# Patient Record
Sex: MALE | Race: WHITE | NOT HISPANIC OR LATINO | Employment: FULL TIME | URBAN - METROPOLITAN AREA
[De-identification: names, ages, dates, MRNs, and addresses within clinical notes are randomized per-mention and may not be internally consistent; named-entity substitution may affect disease eponyms.]

---

## 2017-01-20 RX ORDER — TADALAFIL 5 MG
TABLET ORAL
Qty: 30 TABLET | Refills: 2 | Status: SHIPPED | OUTPATIENT
Start: 2017-01-20 | End: 2018-09-17

## 2017-02-07 NOTE — TELEPHONE ENCOUNTER
2/7/17    Can we phone this rx refill into the pharmacy?    Kris has been checked and scanned into pt's chart (12/23/16).    Last OV was on 11/22/16, next OV is on 3/22/16.

## 2017-02-08 RX ORDER — BUTALBITAL, ACETAMINOPHEN, CAFFEINE, AND CODEINE PHOSPHATE 50; 300; 40; 30 MG/1; MG/1; MG/1; MG/1
CAPSULE ORAL
Qty: 40 CAPSULE | Refills: 0 | OUTPATIENT
Start: 2017-02-08 | End: 2017-02-21 | Stop reason: SDUPTHER

## 2017-02-08 NOTE — TELEPHONE ENCOUNTER
2/8/17    Can we phone this rx in to the pharmacy?    Kris has been checked and scanned into pt's chart (12/23/16).    Last OV was on 11/22/16, next OV is on 3/22/16.

## 2017-02-09 ENCOUNTER — TELEPHONE (OUTPATIENT)
Dept: INTERNAL MEDICINE | Facility: CLINIC | Age: 47
End: 2017-02-09

## 2017-02-09 RX ORDER — BUTALBITAL, ACETAMINOPHEN, CAFFEINE, AND CODEINE PHOSPHATE 50; 300; 40; 30 MG/1; MG/1; MG/1; MG/1
CAPSULE ORAL
Qty: 40 CAPSULE | Refills: 0 | OUTPATIENT
Start: 2017-02-09

## 2017-02-09 NOTE — TELEPHONE ENCOUNTER
----- Message from Angelica Daigle sent at 2/9/2017 12:35 PM EST -----  THE PATIENT IS CALLING IN REGARDS TO HIS FLORICET MEDICATION THAT HE HAD CALLED TO GET A REFILL ON AND WOULD LIKE FOR YOU TO GIVE HIM A CALL BACK BECAUSE HIS PHARMACY IS TELLING HIM THEY STILL HAVEN'T RECEIVED ANYTHING FROM OUR OFFICE. PT CALL BACK NUMBER [phone]

## 2017-02-14 ENCOUNTER — TELEPHONE (OUTPATIENT)
Dept: INTERNAL MEDICINE | Facility: CLINIC | Age: 47
End: 2017-02-14

## 2017-02-14 NOTE — TELEPHONE ENCOUNTER
2/14/17    Need to do a prior auth for cialis.    Started a PA through cover my meds:    [facility] Member ID: [id number]    RX Bin: [id number]    Rx Group: [facility] PPO Gold Plan    Member ID: [id number]    Rx Bin: [id number]    Rx Group: [facility]

## 2017-02-22 ENCOUNTER — TELEPHONE (OUTPATIENT)
Dept: INTERNAL MEDICINE | Facility: CLINIC | Age: 47
End: 2017-02-22

## 2017-02-22 NOTE — TELEPHONE ENCOUNTER
----- Message from Hilary Salgado sent at 2/22/2017  9:03 AM EST -----  Contact: PATIENT   RE: AMBIEN CONTROLLED RELEASE    PATIENT IS REQUESTING AN ORDER FOR AMBIEN CONTROLLED RELEASE INSTEAD OF REGULAR AMBIEN 10MG. HE WOULD LIKE TO KNOW IF THIS COULD BE DONE TODAY. HE STATES THAT HE HAS HAD A RECENT LOSS OF A FAMILY MEMBER AND IS NOT SLEEPING WELL.    [facility] IN [place], [place]

## 2017-02-24 RX ORDER — BUTALBITAL, ACETAMINOPHEN, CAFFEINE, AND CODEINE PHOSPHATE 50; 300; 40; 30 MG/1; MG/1; MG/1; MG/1
CAPSULE ORAL
Qty: 40 CAPSULE | Refills: 0 | OUTPATIENT
Start: 2017-02-24 | End: 2018-06-14 | Stop reason: SDUPTHER

## 2017-02-24 RX ORDER — ZOLPIDEM TARTRATE 12.5 MG/1
12.5 TABLET, FILM COATED, EXTENDED RELEASE ORAL NIGHTLY PRN
Qty: 30 TABLET | Refills: 3 | OUTPATIENT
Start: 2017-02-24 | End: 2017-10-17

## 2017-02-24 NOTE — TELEPHONE ENCOUNTER
2/24/17    Pt called to check the status of request. Since the response was sent to another clinical staff, I did not get this response from the provider.    Rx has been phoned in to pharmacy.

## 2017-03-01 ENCOUNTER — HOSPITAL ENCOUNTER (EMERGENCY)
Facility: HOSPITAL | Age: 47
Discharge: HOME | End: 2017-03-01
Attending: EMERGENCY MEDICINE | Admitting: EMERGENCY MEDICINE

## 2017-03-01 VITALS
DIASTOLIC BLOOD PRESSURE: 79 MMHG | BODY MASS INDEX: 31.1 KG/M2 | HEIGHT: 69 IN | OXYGEN SATURATION: 97 % | HEART RATE: 66 BPM | TEMPERATURE: 98.6 F | RESPIRATION RATE: 18 BRPM | SYSTOLIC BLOOD PRESSURE: 127 MMHG | WEIGHT: 210 LBS

## 2017-03-01 DIAGNOSIS — F41.9 ANXIETY: ICD-10-CM

## 2017-03-01 DIAGNOSIS — K52.9 GASTROENTERITIS: Primary | ICD-10-CM

## 2017-03-01 LAB
ALBUMIN SERPL-MCNC: 4.7 G/DL (ref 3.5–5)
ALBUMIN/GLOB SERPL: 1.5 G/DL (ref 1–2)
ALP SERPL-CCNC: 98 U/L (ref 38–126)
ALT SERPL W P-5'-P-CCNC: 43 U/L (ref 13–69)
ANION GAP SERPL CALCULATED.3IONS-SCNC: 13.7 MMOL/L
AST SERPL-CCNC: 29 U/L (ref 15–46)
BASOPHILS # BLD AUTO: 0.13 10*3/MM3 (ref 0–0.2)
BASOPHILS NFR BLD AUTO: 1.4 % (ref 0–2.5)
BILIRUB SERPL-MCNC: 0.6 MG/DL (ref 0.2–1.3)
BUN BLD-MCNC: 14 MG/DL (ref 7–20)
BUN/CREAT SERPL: 14 (ref 6.3–21.9)
CALCIUM SPEC-SCNC: 9.4 MG/DL (ref 8.4–10.2)
CHLORIDE SERPL-SCNC: 103 MMOL/L (ref 98–107)
CK MB SERPL-CCNC: 0.61 NG/ML (ref 0.2–2.4)
CK SERPL-CCNC: 69 U/L (ref 30–170)
CO2 SERPL-SCNC: 28 MMOL/L (ref 26–30)
CREAT BLD-MCNC: 1 MG/DL (ref 0.6–1.3)
DEPRECATED RDW RBC AUTO: 39.7 FL (ref 37–54)
EOSINOPHIL # BLD AUTO: 0.37 10*3/MM3 (ref 0–0.7)
EOSINOPHIL NFR BLD AUTO: 3.9 % (ref 0–7)
ERYTHROCYTE [DISTWIDTH] IN BLOOD BY AUTOMATED COUNT: 12.2 % (ref 11.5–14.5)
GFR SERPL CREATININE-BSD FRML MDRD: 80 ML/MIN/1.73
GLOBULIN UR ELPH-MCNC: 3.2 GM/DL
GLUCOSE BLD-MCNC: 167 MG/DL (ref 74–98)
HCT VFR BLD AUTO: 48.6 % (ref 42–52)
HGB BLD-MCNC: 16.9 G/DL (ref 14–18)
HOLD SPECIMEN: NORMAL
HOLD SPECIMEN: NORMAL
IMM GRANULOCYTES # BLD: 0.03 10*3/MM3 (ref 0–0.06)
IMM GRANULOCYTES NFR BLD: 0.3 % (ref 0–0.6)
LIPASE SERPL-CCNC: 79 U/L (ref 23–300)
LYMPHOCYTES # BLD AUTO: 3.88 10*3/MM3 (ref 0.6–3.4)
LYMPHOCYTES NFR BLD AUTO: 41.3 % (ref 10–50)
MCH RBC QN AUTO: 31 PG (ref 27–31)
MCHC RBC AUTO-ENTMCNC: 34.8 G/DL (ref 30–37)
MCV RBC AUTO: 89 FL (ref 80–94)
MONOCYTES # BLD AUTO: 0.81 10*3/MM3 (ref 0–0.9)
MONOCYTES NFR BLD AUTO: 8.6 % (ref 0–12)
NEUTROPHILS # BLD AUTO: 4.18 10*3/MM3 (ref 2–6.9)
NEUTROPHILS NFR BLD AUTO: 44.5 % (ref 37–80)
NRBC BLD MANUAL-RTO: 0 /100 WBC (ref 0–0)
PLATELET # BLD AUTO: 304 10*3/MM3 (ref 130–400)
PMV BLD AUTO: 9.6 FL (ref 6–12)
POTASSIUM BLD-SCNC: 4.7 MMOL/L (ref 3.5–5.1)
PROT SERPL-MCNC: 7.9 G/DL (ref 6.3–8.2)
RBC # BLD AUTO: 5.46 10*6/MM3 (ref 4.7–6.1)
SODIUM BLD-SCNC: 140 MMOL/L (ref 137–145)
TROPONIN I SERPL-MCNC: <0.012 NG/ML (ref 0–0.03)
WBC NRBC COR # BLD: 9.4 10*3/MM3 (ref 4.8–10.8)
WHOLE BLOOD HOLD SPECIMEN: NORMAL
WHOLE BLOOD HOLD SPECIMEN: NORMAL

## 2017-03-01 PROCEDURE — 80053 COMPREHEN METABOLIC PANEL: CPT | Performed by: EMERGENCY MEDICINE

## 2017-03-01 PROCEDURE — 85025 COMPLETE CBC W/AUTO DIFF WBC: CPT | Performed by: EMERGENCY MEDICINE

## 2017-03-01 PROCEDURE — 36415 COLL VENOUS BLD VENIPUNCTURE: CPT

## 2017-03-01 PROCEDURE — 84484 ASSAY OF TROPONIN QUANT: CPT | Performed by: EMERGENCY MEDICINE

## 2017-03-01 PROCEDURE — 82550 ASSAY OF CK (CPK): CPT | Performed by: EMERGENCY MEDICINE

## 2017-03-01 PROCEDURE — 83690 ASSAY OF LIPASE: CPT | Performed by: EMERGENCY MEDICINE

## 2017-03-01 PROCEDURE — 25010000002 KETOROLAC TROMETHAMINE PER 15 MG: Performed by: EMERGENCY MEDICINE

## 2017-03-01 PROCEDURE — 96361 HYDRATE IV INFUSION ADD-ON: CPT

## 2017-03-01 PROCEDURE — 82553 CREATINE MB FRACTION: CPT | Performed by: EMERGENCY MEDICINE

## 2017-03-01 PROCEDURE — 99284 EMERGENCY DEPT VISIT MOD MDM: CPT

## 2017-03-01 PROCEDURE — 93005 ELECTROCARDIOGRAM TRACING: CPT | Performed by: EMERGENCY MEDICINE

## 2017-03-01 PROCEDURE — 96374 THER/PROPH/DIAG INJ IV PUSH: CPT

## 2017-03-01 RX ORDER — PROPRANOLOL HYDROCHLORIDE 20 MG/1
40 TABLET ORAL ONCE
Status: COMPLETED | OUTPATIENT
Start: 2017-03-01 | End: 2017-03-01

## 2017-03-01 RX ORDER — PROPRANOLOL HYDROCHLORIDE 40 MG/1
40 TABLET ORAL 2 TIMES DAILY
Qty: 14 TABLET | Refills: 0 | Status: SHIPPED | OUTPATIENT
Start: 2017-03-01 | End: 2017-03-08

## 2017-03-01 RX ORDER — SODIUM CHLORIDE 0.9 % (FLUSH) 0.9 %
10 SYRINGE (ML) INJECTION AS NEEDED
Status: DISCONTINUED | OUTPATIENT
Start: 2017-03-01 | End: 2017-03-01 | Stop reason: HOSPADM

## 2017-03-01 RX ORDER — ONDANSETRON 4 MG/1
4 TABLET, ORALLY DISINTEGRATING ORAL EVERY 6 HOURS PRN
Qty: 10 TABLET | Refills: 0 | Status: SHIPPED | OUTPATIENT
Start: 2017-03-01 | End: 2018-09-17

## 2017-03-01 RX ORDER — KETOROLAC TROMETHAMINE 30 MG/ML
30 INJECTION, SOLUTION INTRAMUSCULAR; INTRAVENOUS ONCE
Status: COMPLETED | OUTPATIENT
Start: 2017-03-01 | End: 2017-03-01

## 2017-03-01 RX ADMIN — KETOROLAC TROMETHAMINE 30 MG: 30 INJECTION, SOLUTION INTRAMUSCULAR at 08:24

## 2017-03-01 RX ADMIN — PROPRANOLOL HYDROCHLORIDE 40 MG: 20 TABLET ORAL at 08:24

## 2017-03-01 RX ADMIN — SODIUM CHLORIDE 1000 ML: 9 INJECTION, SOLUTION INTRAVENOUS at 07:53

## 2017-03-01 NOTE — ED PROVIDER NOTES
Subjective   History of Present Illness  TRIAGE CHIEF COMPLAINT:   Chief Complaint   Patient presents with   • Vomiting   • Anxiety         HPI: Sina Reyez   is a 46 y.o. male   who presents to the emergency department complaining of nausea and anxiety.  Patient states he has been ill with stomach flu for the past 2 days.  He has had symptoms of nausea vomiting and diarrhea.  Last episodes of vomiting and diarrhea yesterday however he does have residual nausea.  Patient states he feels palpitations, some chest pressure and states this is similar to anxiety attacks he used to experience.  Patient states he had been doing well recently however today he began thinking about recent deaths in the family and believes this may have triggered his current symptoms.  Denies SI.  Denies substance abuse.  Denies maisha chest pain, fever, chills, near-syncope.           Review of Systems   All other systems reviewed and are negative.      Past Medical History   Diagnosis Date   • Acute sinusitis    • Acute stress disorder    • Anxiety    • Hypertension    • MRSA infection    • Rib pain on left side    • Toe injury    • Verruca    • Viral gastroenteritis        No Known Allergies    History reviewed. No pertinent past surgical history.    Family History   Problem Relation Age of Onset   • Transient ischemic attack Mother    • Stroke Father        Social History     Social History   • Marital status:      Spouse name: N/A   • Number of children: N/A   • Years of education: N/A     Social History Main Topics   • Smoking status: Never Smoker   • Smokeless tobacco: Never Used   • Alcohol use Yes      Comment: ocassionally   • Drug use: No   • Sexual activity: Defer     Other Topics Concern   • None     Social History Narrative   • None           Objective   Physical Exam    CONSTITUTIONAL: Awake, oriented, appears anxious but non-toxic   HENT: Atraumatic, normocephalic, oral mucosa pink and moist, airway patent. Nares  patent without drainage. External ears normal.   EYES: Conjunctiva clear, EOMI, PERRL   NECK: Trachea midline, non-tender, supple   CARDIOVASCULAR: Normal heart rate, Normal rhythm, No murmurs, rubs, gallops   PULMONARY/CHEST: Clear to auscultation, no rhonchi, wheezes, or rales. Symmetrical breath sounds. Non-tender.   ABDOMINAL: Non-distended, soft, non-tender - no rebound or guarding. BS normal.   NEUROLOGIC: Non-focal, moving all four extremities, no gross sensory or motor deficits.   EXTREMITIES: No clubbing, cyanosis, or edema   SKIN: Warm, Dry, No erythema, No rash     No orders to display           EKG:  NSR, rate 102.  No acute ST changes.         Procedures         ED Course  ED Course          ED COURSE / MEDICAL DECISION MAKING:   Symptoms resolved with medication.  Patient is well-appearing.  Unremarkable workup.  Plan for close outpatient follow-up versus return if worse.    DECISION TO DISCHARGE/ADMIT: see ED care timeline       Electronically signed by: Lobito Lama MD, 3/1/2017 10:30 AM              [facility]    Final diagnoses:   Gastroenteritis   Anxiety            Lobito Lama MD  03/01/17 1035

## 2017-03-06 RX ORDER — LISINOPRIL 10 MG/1
TABLET ORAL
Qty: 90 TABLET | Refills: 3 | Status: SHIPPED | OUTPATIENT
Start: 2017-03-06 | End: 2017-10-17 | Stop reason: SDUPTHER

## 2017-10-17 ENCOUNTER — OFFICE VISIT (OUTPATIENT)
Dept: INTERNAL MEDICINE | Facility: CLINIC | Age: 47
End: 2017-10-17

## 2017-10-17 VITALS
HEART RATE: 93 BPM | WEIGHT: 217 LBS | DIASTOLIC BLOOD PRESSURE: 78 MMHG | OXYGEN SATURATION: 99 % | SYSTOLIC BLOOD PRESSURE: 146 MMHG | BODY MASS INDEX: 32.05 KG/M2

## 2017-10-17 DIAGNOSIS — E11.9 TYPE 2 DIABETES MELLITUS WITHOUT COMPLICATION, WITH LONG-TERM CURRENT USE OF INSULIN (HCC): Primary | ICD-10-CM

## 2017-10-17 DIAGNOSIS — I10 BENIGN ESSENTIAL HYPERTENSION: ICD-10-CM

## 2017-10-17 DIAGNOSIS — Z79.4 TYPE 2 DIABETES MELLITUS WITHOUT COMPLICATION, WITH LONG-TERM CURRENT USE OF INSULIN (HCC): Primary | ICD-10-CM

## 2017-10-17 DIAGNOSIS — G43.009 MIGRAINE WITHOUT AURA AND WITHOUT STATUS MIGRAINOSUS, NOT INTRACTABLE: ICD-10-CM

## 2017-10-17 LAB
ALBUMIN SERPL-MCNC: 4.4 G/DL (ref 3.2–4.8)
ALBUMIN/GLOB SERPL: 1.6 G/DL (ref 1.5–2.5)
ALP SERPL-CCNC: 137 U/L (ref 25–100)
ALT SERPL W P-5'-P-CCNC: 31 U/L (ref 7–40)
ANION GAP SERPL CALCULATED.3IONS-SCNC: 7 MMOL/L (ref 3–11)
ARTICHOKE IGE QN: 108 MG/DL (ref 0–130)
AST SERPL-CCNC: 25 U/L (ref 0–33)
BILIRUB SERPL-MCNC: 0.4 MG/DL (ref 0.3–1.2)
BUN BLD-MCNC: 19 MG/DL (ref 9–23)
BUN/CREAT SERPL: 17.3 (ref 7–25)
CALCIUM SPEC-SCNC: 9.6 MG/DL (ref 8.7–10.4)
CHLORIDE SERPL-SCNC: 99 MMOL/L (ref 99–109)
CHOLEST SERPL-MCNC: 235 MG/DL (ref 0–200)
CO2 SERPL-SCNC: 29 MMOL/L (ref 20–31)
CREAT BLD-MCNC: 1.1 MG/DL (ref 0.6–1.3)
GFR SERPL CREATININE-BSD FRML MDRD: 71 ML/MIN/1.73
GLOBULIN UR ELPH-MCNC: 2.7 GM/DL
GLUCOSE BLD-MCNC: 297 MG/DL (ref 70–100)
GLUCOSE BLDC GLUCOMTR-MCNC: 317 MG/DL (ref 70–130)
HBA1C MFR BLD: 9.2 %
HDLC SERPL-MCNC: 44 MG/DL (ref 40–60)
POTASSIUM BLD-SCNC: 5.6 MMOL/L (ref 3.5–5.5)
PROT SERPL-MCNC: 7.1 G/DL (ref 5.7–8.2)
SODIUM BLD-SCNC: 135 MMOL/L (ref 132–146)
TRIGL SERPL-MCNC: 607 MG/DL (ref 0–150)

## 2017-10-17 PROCEDURE — 80053 COMPREHEN METABOLIC PANEL: CPT | Performed by: PHYSICIAN ASSISTANT

## 2017-10-17 PROCEDURE — 82947 ASSAY GLUCOSE BLOOD QUANT: CPT | Performed by: PHYSICIAN ASSISTANT

## 2017-10-17 PROCEDURE — 86341 ISLET CELL ANTIBODY: CPT | Performed by: PHYSICIAN ASSISTANT

## 2017-10-17 PROCEDURE — 99214 OFFICE O/P EST MOD 30 MIN: CPT | Performed by: PHYSICIAN ASSISTANT

## 2017-10-17 PROCEDURE — 84681 ASSAY OF C-PEPTIDE: CPT | Performed by: PHYSICIAN ASSISTANT

## 2017-10-17 PROCEDURE — 80061 LIPID PANEL: CPT | Performed by: PHYSICIAN ASSISTANT

## 2017-10-17 PROCEDURE — 83525 ASSAY OF INSULIN: CPT | Performed by: PHYSICIAN ASSISTANT

## 2017-10-17 PROCEDURE — 83036 HEMOGLOBIN GLYCOSYLATED A1C: CPT | Performed by: PHYSICIAN ASSISTANT

## 2017-10-17 PROCEDURE — 83527 ASSAY OF INSULIN: CPT | Performed by: PHYSICIAN ASSISTANT

## 2017-10-17 RX ORDER — LISINOPRIL 40 MG/1
TABLET ORAL
Qty: 90 TABLET | Refills: 1 | Status: SHIPPED | OUTPATIENT
Start: 2017-10-17 | End: 2018-04-17 | Stop reason: SDUPTHER

## 2017-10-17 NOTE — ASSESSMENT & PLAN NOTE
Headaches are unchanged.  Continue current treatment regimen.   Refilled fioricet/codeine #40, 0RF per Dr Suarez.    The patient has read and signed the [facility] Controlled Substance Contract.  I will continue to see patient for regular follow up appointments.  They are well controlled on their medication.  RAMYA is updated every 3 months. The patient is aware of the potential for addiction and dependence.

## 2017-10-17 NOTE — PROGRESS NOTES
Chief Complaint   Patient presents with   • Follow-up     type 2 diabetes mellitus, hypertension       Subjective   Sina Reyez is a 47 y.o. male.       History of Present Illness     Pt was previous pt of Dr Peter's and is here to establish care. He has not been out of his medication- has been able to use some of his dad's novolin 70/30, rarely uses his novolog for sliding scale with mealtime. His BS have been elevated in the mornings. He was diagnosed with diabetes in 4648-1436 and was told he had type 1. He has been on the same 20 units BID for years. He is careful about watching the carbs in his diet. Has not been exercising as much as he used to.    He had a rough beginning of the year with several illnesses- influenza, sinusitis and gastroenteritis. Seen at urgent care. Was given refills of his BP meds. BP was elevated over the summer and his lisinopril was increased to 20 mg. BP has continued to be above 150.    He is no longer taking the Ambien. Weaned himself off and is sleeping well without it.    He gets tension/migraine headaches about once a week and takes fioricet/codeine at onset. Has tried muscle relaxers and triptans with no relief. This is the only thing that works. He got rx for #40 tablets in Feb that have lasted until now.        Current Outpatient Prescriptions:   •  Cetirizine HCl (ZYRTEC ALLERGY) 10 MG capsule, Take 1 capsule by mouth As Needed., Disp: , Rfl:   •  CIALIS 5 MG tablet, TAKE ONE TABLET BY MOUTH DAILY, Disp: 30 tablet, Rfl: 2  •  FIORICET/CODEINE -25-30 MG capsule, TAKE ONE TO TWO CAPSULES BY MOUTH EVERY 4 TO 6 HOURS AS NEEDED, Disp: 40 capsule, Rfl: 0  •  insulin aspart protamine-insulin aspart (novoLOG 70/30) (70-30) 100 UNIT/ML injection, Inject 20 Units under the skin 2 (Two) Times a Day With Meals., Disp: , Rfl:   •  lisinopril (PRINIVIL,ZESTRIL) 40 MG tablet, TAKE ONE TABLET BY MOUTH DAILY., Disp: 90 tablet, Rfl: 1  •  naproxen sodium (ALEVE) 220 MG tablet,  Take 1 tablet by mouth As Needed., Disp: , Rfl:   •  ondansetron ODT (ZOFRAN-ODT) 4 MG disintegrating tablet, Take 1 tablet by mouth Every 6 (Six) Hours As Needed for nausea or vomiting., Disp: 10 tablet, Rfl: 0     PMFSH  The following portions of the patient's history were reviewed and updated as appropriate: allergies, current medications, past family history, past medical history, past social history, past surgical history and problem list.    Review of Systems   Constitutional: Negative for activity change, appetite change, fatigue and unexpected weight change.   HENT: Negative for dental problem, ear pain, nosebleeds and sore throat.    Eyes: Negative for pain and discharge.   Respiratory: Negative for chest tightness, shortness of breath and wheezing.    Gastrointestinal: Negative for abdominal pain and blood in stool.   Endocrine: Negative.    Genitourinary: Negative for difficulty urinating and hematuria.   Musculoskeletal: Negative for joint swelling.   Skin: Negative for color change, pallor, rash and wound.   Allergic/Immunologic: Negative.    Neurological: Positive for headaches. Negative for dizziness, tremors, seizures, syncope, facial asymmetry, speech difficulty and numbness.   Hematological: Negative for adenopathy.   Psychiatric/Behavioral: Negative for agitation, confusion, sleep disturbance and suicidal ideas.       Objective   /78  Pulse 93  Wt 217 lb (98.4 kg)  SpO2 99%  BMI 32.05 kg/m2    Physical Exam   Constitutional: He appears well-developed and well-nourished.   HENT:   Head: Normocephalic.   Right Ear: Hearing, tympanic membrane, external ear and ear canal normal.   Left Ear: Hearing, tympanic membrane, external ear and ear canal normal.   Nose: Nose normal.   Mouth/Throat: Oropharynx is clear and moist.   Eyes: Conjunctivae are normal. Pupils are equal, round, and reactive to light.   Neck: Normal range of motion.   Cardiovascular: Normal rate, regular rhythm and normal heart  sounds.    Pulmonary/Chest: Effort normal and breath sounds normal. He has no decreased breath sounds. He has no wheezes. He has no rhonchi. He has no rales.   Musculoskeletal: Normal range of motion.   Neurological: He is alert.   Skin: Skin is warm and dry.   Psychiatric: He has a normal mood and affect. His behavior is normal.   Nursing note and vitals reviewed.      Results for orders placed or performed in visit on 10/17/17   POCT Glucose   Result Value Ref Range    Glucose 317 (A) 70 - 130 mg/dL   POC Glycosylated Hemoglobin (Hb A1C)   Result Value Ref Range    Hemoglobin A1C 9.2 %        ASSESSMENT/PLAN    Problem List Items Addressed This Visit        Cardiovascular and Mediastinum    Benign essential hypertension     Hypertension is worsening.  Dietary sodium restriction.  Weight loss.  Regular aerobic exercise.  Medication changes per orders.  Ambulatory blood pressure monitoring. Increase lisinopril to 40 mg daily.  Blood pressure will be reassessed in 3 months.         Relevant Medications    lisinopril (PRINIVIL,ZESTRIL) 40 MG tablet    Migraine headache     Headaches are unchanged.  Continue current treatment regimen.   Refilled fioricet/codeine #40, 0RF per Dr Suarez.    The patient has read and signed the [facility] Controlled Substance Contract.  I will continue to see patient for regular follow up appointments.  They are well controlled on their medication.  RAMYA is updated every 3 months. The patient is aware of the potential for addiction and dependence.                  Endocrine    Type 2 diabetes mellitus - Primary     Diabetes is worsening.   Reminded to bring in blood sugar diary at next visit.  Dietary recommendations for ADA diet.  Regular aerobic exercise.  Medication changes per orders. Check labs as ordered to confirm type 1 vs type 2 diabetes. Increase novolog to 23 units BID and increase to 25 units BID if fasting sugars remain above 150. Further recommendations based on  results.  Diabetes will be reassessed in 3 months.         Relevant Orders    POCT Glucose (Completed)    POC Glycosylated Hemoglobin (Hb A1C) (Completed)    C-Peptide    Insulin, Free & Total, Serum    Glutamic Acid Decarboxylase    Lipid Panel    Comprehensive Metabolic Panel               Return in about 3 months (around 1/17/2018) for Recheck.

## 2017-10-17 NOTE — ASSESSMENT & PLAN NOTE
Hypertension is worsening.  Dietary sodium restriction.  Weight loss.  Regular aerobic exercise.  Medication changes per orders.  Ambulatory blood pressure monitoring. Increase lisinopril to 40 mg daily.  Blood pressure will be reassessed in 3 months.

## 2017-10-17 NOTE — ASSESSMENT & PLAN NOTE
Diabetes is worsening.   Reminded to bring in blood sugar diary at next visit.  Dietary recommendations for ADA diet.  Regular aerobic exercise.  Medication changes per orders. Check labs as ordered to confirm type 1 vs type 2 diabetes. Increase novolog to 23 units BID and increase to 25 units BID if fasting sugars remain above 150. Further recommendations based on results.  Diabetes will be reassessed in 3 months.

## 2017-10-18 ENCOUNTER — TELEPHONE (OUTPATIENT)
Dept: INTERNAL MEDICINE | Facility: CLINIC | Age: 47
End: 2017-10-18

## 2017-10-18 DIAGNOSIS — E87.5 HYPERKALEMIA: Primary | ICD-10-CM

## 2017-10-18 LAB — C PEPTIDE SERPL-MCNC: 3 NG/ML (ref 1.1–4.4)

## 2017-10-19 NOTE — TELEPHONE ENCOUNTER
Called pt, per pt he received your message in regards to results through [employer] but was wondering what type of diabetes he has and if he needs to start any medication for it, pls advise.

## 2017-10-19 NOTE — TELEPHONE ENCOUNTER
Those labs are still pending and I will let him know when the results are available.    For now he should increase his novolog to 23 units BID and then up to 25 units BID if fasting sugars remain above 150.

## 2017-10-19 NOTE — PROGRESS NOTES
Your labs show elevated triglycerides from your uncontrolled glucose. This should come down as your blood sugars become better controlled.    Your potassium was slightly elevated. Please stop by for a recheck of a potassium level to make sure it returns to normal.     The C-peptide is normal but the other levels are still pending.

## 2017-10-20 LAB — GAD65 AB SER-ACNC: 531.7 U/ML (ref 0–5)

## 2017-10-20 NOTE — PROGRESS NOTES
The glutamic acid decarboxylase level is elevated and this corresponds with a diagnosis of Type I diabetes. This means you are limited to insulin for treatment. Please let me know if your blood sugars are not improving with the increase in your novolog dose.

## 2017-10-21 LAB
INSULIN FREE SERPL-ACNC: 35 UU/ML
INSULIN SERPL-ACNC: 35 UU/ML

## 2018-04-06 ENCOUNTER — TELEPHONE (OUTPATIENT)
Dept: INTERNAL MEDICINE | Facility: CLINIC | Age: 48
End: 2018-04-06

## 2018-04-06 ENCOUNTER — OFFICE VISIT (OUTPATIENT)
Dept: INTERNAL MEDICINE | Facility: CLINIC | Age: 48
End: 2018-04-06

## 2018-04-06 VITALS
OXYGEN SATURATION: 98 % | WEIGHT: 221 LBS | BODY MASS INDEX: 32.64 KG/M2 | DIASTOLIC BLOOD PRESSURE: 78 MMHG | SYSTOLIC BLOOD PRESSURE: 124 MMHG | HEART RATE: 99 BPM

## 2018-04-06 DIAGNOSIS — G43.009 MIGRAINE WITHOUT AURA AND WITHOUT STATUS MIGRAINOSUS, NOT INTRACTABLE: ICD-10-CM

## 2018-04-06 DIAGNOSIS — Z79.4 TYPE 2 DIABETES MELLITUS WITHOUT COMPLICATION, WITH LONG-TERM CURRENT USE OF INSULIN (HCC): Primary | ICD-10-CM

## 2018-04-06 DIAGNOSIS — E11.9 TYPE 2 DIABETES MELLITUS WITHOUT COMPLICATION, WITH LONG-TERM CURRENT USE OF INSULIN (HCC): Primary | ICD-10-CM

## 2018-04-06 DIAGNOSIS — I10 BENIGN ESSENTIAL HYPERTENSION: ICD-10-CM

## 2018-04-06 LAB
GLUCOSE BLDC GLUCOMTR-MCNC: 208 MG/DL (ref 70–130)
HBA1C MFR BLD: 9.7 %

## 2018-04-06 PROCEDURE — 82947 ASSAY GLUCOSE BLOOD QUANT: CPT | Performed by: PHYSICIAN ASSISTANT

## 2018-04-06 PROCEDURE — 99214 OFFICE O/P EST MOD 30 MIN: CPT | Performed by: PHYSICIAN ASSISTANT

## 2018-04-06 PROCEDURE — 83036 HEMOGLOBIN GLYCOSYLATED A1C: CPT | Performed by: PHYSICIAN ASSISTANT

## 2018-04-06 NOTE — ASSESSMENT & PLAN NOTE
Hypertension is improving with treatment.  Continue current treatment regimen.  Dietary sodium restriction.  Weight loss.  Regular aerobic exercise.  Blood pressure will be reassessed in 3 months.

## 2018-04-06 NOTE — TELEPHONE ENCOUNTER
Received call from pharmacist wanting to clarify which strength of tylenol is supposed to be filled, 325 or 300, not specified on the script that pt brought in, pls advise.

## 2018-04-06 NOTE — PROGRESS NOTES
Chief Complaint   Patient presents with   • Follow-up     type 2 diabetes, hypertension       Subjective   Sina Reyez is a 47 y.o. male.       History of Present Illness     Pt has increased his humulin 70/30 to 28 units. He has continued to have fasting sugars of 180 and post prandial readings of 300. He is not exercising as much as he used to but does eat low carb meals. Rarely eats breads and pastas.     He has done fine with the increase dose of Lisinopril 40 mg daily.     He is struggling with the cost of fioricet. It is the only thing that he can take that treats his headaches. Tried injectable imitrex and other meds for headaches. Tried topamax and had adverse reaction. He has seen neurologist in the past and had extensive testing that showed proclivity for seizures. Tried multiple medications without any relief. Continues to have 1 headache a week, often related to weather changes. This is better than the daily headaches he used to have. Recently he tried some tylenol #3 that his son had leftover and it worked just as well as the fioricet.        Current Outpatient Prescriptions:   •  Cetirizine HCl (ZYRTEC ALLERGY) 10 MG capsule, Take 1 capsule by mouth As Needed., Disp: , Rfl:   •  CIALIS 5 MG tablet, TAKE ONE TABLET BY MOUTH DAILY, Disp: 30 tablet, Rfl: 2  •  FIORICET/CODEINE -99-30 MG capsule, TAKE ONE TO TWO CAPSULES BY MOUTH EVERY 4 TO 6 HOURS AS NEEDED, Disp: 40 capsule, Rfl: 0  •  lisinopril (PRINIVIL,ZESTRIL) 40 MG tablet, TAKE ONE TABLET BY MOUTH DAILY., Disp: 90 tablet, Rfl: 1  •  naproxen sodium (ALEVE) 220 MG tablet, Take 1 tablet by mouth As Needed., Disp: , Rfl:   •  ondansetron ODT (ZOFRAN-ODT) 4 MG disintegrating tablet, Take 1 tablet by mouth Every 6 (Six) Hours As Needed for nausea or vomiting., Disp: 10 tablet, Rfl: 0  •  Insulin Glargine (TOUJEO SOLOSTAR) 300 UNIT/ML solution pen-injector, Inject 28 Units under the skin every night at bedtime., Disp: 1.5 mL, Rfl: 3  •   Insulin Lispro (HUMALOG KWIKPEN) 100 UNIT/ML solution pen-injector, 8 units subcutaneous with breakfast; 9 units subq with lunch; 10 units subq with dinner, Disp: 9 mL, Rfl: 3     PMFSH  The following portions of the patient's history were reviewed and updated as appropriate: allergies, current medications, past family history, past medical history, past social history, past surgical history and problem list.    Review of Systems   Constitutional: Negative for activity change, appetite change, fatigue and unexpected weight change.   HENT: Negative for congestion, dental problem, ear pain, nosebleeds, rhinorrhea and sore throat.    Eyes: Negative for pain and discharge.   Respiratory: Negative for chest tightness, shortness of breath and wheezing.    Cardiovascular: Negative for chest pain and palpitations.   Gastrointestinal: Negative for abdominal pain and blood in stool.   Endocrine: Negative.    Genitourinary: Negative for difficulty urinating, dysuria and hematuria.   Musculoskeletal: Negative for arthralgias, joint swelling and myalgias.   Skin: Negative for color change, pallor, rash and wound.   Allergic/Immunologic: Negative.    Neurological: Negative for dizziness, tremors, seizures, syncope, facial asymmetry, speech difficulty, weakness, light-headedness, numbness and headaches.   Hematological: Negative for adenopathy.   Psychiatric/Behavioral: Negative for agitation, confusion, dysphoric mood, sleep disturbance and suicidal ideas. The patient is not nervous/anxious.        Objective   /78   Pulse 99   Wt 100 kg (221 lb)   SpO2 98%   BMI 32.64 kg/m²     Physical Exam   Constitutional: He is oriented to person, place, and time. He appears well-developed and well-nourished.  Non-toxic appearance. No distress.   HENT:   Head: Normocephalic and atraumatic. Head is without right periorbital erythema and without left periorbital erythema.   Right Ear: Hearing, tympanic membrane, external ear and ear  canal normal.   Left Ear: Hearing, tympanic membrane, external ear and ear canal normal.   Nose: Nose normal.   Mouth/Throat: Oropharynx is clear and moist.   Eyes: Conjunctivae and EOM are normal. Pupils are equal, round, and reactive to light. Right eye exhibits no discharge. Left eye exhibits no discharge. No scleral icterus.   Neck: Normal range of motion. Neck supple.   Cardiovascular: Normal rate, regular rhythm and normal heart sounds.    No murmur heard.  Pulmonary/Chest: Effort normal and breath sounds normal. He has no decreased breath sounds. He has no wheezes. He has no rhonchi. He has no rales.   Abdominal: Soft. There is no tenderness.   Musculoskeletal: Normal range of motion. He exhibits no tenderness or deformity.   Neurological: He is alert and oriented to person, place, and time. He displays no atrophy, no tremor and normal reflexes. No cranial nerve deficit. He exhibits normal muscle tone. Coordination normal.   Skin: Skin is warm and dry. No rash noted. He is not diaphoretic. No erythema.   Psychiatric: He has a normal mood and affect. His behavior is normal. Judgment and thought content normal.   Nursing note and vitals reviewed.      Results for orders placed or performed in visit on 04/06/18   POCT Glucose   Result Value Ref Range    Glucose 208 (A) 70 - 130 mg/dL   POC Glycosylated Hemoglobin (Hb A1C)   Result Value Ref Range    Hemoglobin A1C 9.7 %        ASSESSMENT/PLAN    Problem List Items Addressed This Visit        Cardiovascular and Mediastinum    Benign essential hypertension     Hypertension is improving with treatment.  Continue current treatment regimen.  Dietary sodium restriction.  Weight loss.  Regular aerobic exercise.  Blood pressure will be reassessed in 3 months.         Migraine headache     Headaches are unchanged.  Continue current treatment regimen. Discussed changing to Tylenol/codiene d/t cost but it is not indicated to migraine treatment and we do not prescribe  chronic narcotics. Refilled fioricet w/ codeine and will refer to neurology for additional recommendations             Relevant Orders    Ambulatory Referral to Neurology       Endocrine    Type 2 diabetes mellitus - Primary     Diabetes is worsening.   Reminded to bring in blood sugar diary at next visit.  Dietary recommendations for ADA diet.  Regular aerobic exercise.  Medication changes per orders.  Endocrinology clinic referral. Discussed with Endocrinology PA and changed insulin from novolog 70/30 28 units BID to Toujeo 28 units qh and humalog 8 units with breakfast, 9 units with lunch and 10 units with dinner.  Diabetes will be reassessed in 1 month.         Relevant Medications    Insulin Lispro (HUMALOG KWIKPEN) 100 UNIT/ML solution pen-injector    Insulin Glargine (TOUJEO SOLOSTAR) 300 UNIT/ML solution pen-injector    Other Relevant Orders    POCT Glucose (Completed)    POC Glycosylated Hemoglobin (Hb A1C) (Completed)    Ambulatory Referral to Endocrinology      Other Visit Diagnoses    None.              Return in about 3 months (around 7/6/2018) for Annual physical.

## 2018-04-06 NOTE — ASSESSMENT & PLAN NOTE
Headaches are unchanged.  Continue current treatment regimen. Discussed changing to Tylenol/codiene d/t cost but it is not indicated to migraine treatment and we do not prescribe chronic narcotics. Refilled fioricet w/ codeine and will refer to neurology for additional recommendations

## 2018-04-06 NOTE — ASSESSMENT & PLAN NOTE
Diabetes is worsening.   Reminded to bring in blood sugar diary at next visit.  Dietary recommendations for ADA diet.  Regular aerobic exercise.  Medication changes per orders.  Endocrinology clinic referral. Discussed with Endocrinology PA and changed insulin from novolog 70/30 28 units BID to Toujeo 28 units qh and humalog 8 units with breakfast, 9 units with lunch and 10 units with dinner.  Diabetes will be reassessed in 1 month.

## 2018-04-08 ENCOUNTER — TELEPHONE (OUTPATIENT)
Dept: INTERNAL MEDICINE | Facility: CLINIC | Age: 48
End: 2018-04-08

## 2018-04-08 DIAGNOSIS — E10.65 UNCONTROLLED TYPE 1 DIABETES MELLITUS WITH HYPERGLYCEMIA (HCC): Primary | ICD-10-CM

## 2018-04-08 PROBLEM — IMO0002 UNCONTROLLED TYPE 1 DIABETES MELLITUS: Status: ACTIVE | Noted: 2018-04-08

## 2018-04-08 NOTE — TELEPHONE ENCOUNTER
Patient calls to report blood glucose 469 this am. His Insulin was changed 2 days ago due to uncontrolled blood glucose up to 300.  He was able to  Rx of Toujeo but Humalog was $500. Calls requesting less expensive insulin or sample. I called and spoke to his pharmacist who informed me that all short acting insulins would be in the $300-$500 range with his insurance.  Patient admits that he is symptomatic with polydipsia and irritability. His glucose has not been this high in years.  I advised him to have his wife drive him to the ER in [place] for glucose control today. I advised him not to drive from [place] to [place] to  samples of insulin. He agreed to this plan.  We did have a sample of Humalog u-200 to provide the patient until he can get in to see Endo. HIs wife will pick this up today. [id number] Exp 07/2020  He is supposed to be taking Toujeo 28 us q hs and Humalog 8 u with breaskfast, 9 u with lunch and 10 units with dinner to total 55 units basal + prandial.  His weight is 100kg.

## 2018-04-09 ENCOUNTER — TELEPHONE (OUTPATIENT)
Dept: INTERNAL MEDICINE | Facility: CLINIC | Age: 48
End: 2018-04-09

## 2018-04-09 DIAGNOSIS — E10.65 UNCONTROLLED TYPE 1 DIABETES MELLITUS WITH HYPERGLYCEMIA (HCC): Primary | ICD-10-CM

## 2018-04-09 NOTE — TELEPHONE ENCOUNTER
Called pt, per pt his BS levels are slowing coming down (yesterday it was in the 400s and this morning he checked it was close to 400), he has been taking toujeo (has a coupon for it thus was able to pick it up at pharmacy) and has also been taking Humalog 200 U/ML samples they picked up yesterday from our clinic, he really likes they Humalog U200 and would like a script for it, it is much affordable and he also has a coupon card for it, got a onetouch meter also.

## 2018-04-09 NOTE — TELEPHONE ENCOUNTER
Sent in the humalog 200 kwikpen, hopefully that is the right one. If his morning fasting sugars remain elevated after 3-4 more days of the toujeo, he can increase the toujeo dose by 2 units every 5-7 days until fasting sugars are below 200.

## 2018-04-09 NOTE — TELEPHONE ENCOUNTER
Yisel Lo routed conversation to You 17 minutes ago (2:46 PM)      Sina Reyez [phone]   Yisel Lo 19 minutes ago (2:44 PM)     Incoming call:  MEDICATION ISSUES       Yisel Lo 19 minutes ago (2:44 PM)     PATIENT INSURANCE IS NOT COVERING HIS HUMALOG QUICKPIN. HE NEEDS US TO PRESCRIBE SOMETHING ELSE THAT INSURANCE WILL COVER AND WONT BE EXPENSIVE OUT OF HIS POCKET. YOU CAN REACH PATIENT BACK [phone].      Documentation

## 2018-04-09 NOTE — TELEPHONE ENCOUNTER
Pt called back in to see if Julia Mandel will look into an insulin in a vile called Novolin R, he states it is more affordable for him.  Please call him back at [phone]

## 2018-04-09 NOTE — TELEPHONE ENCOUNTER
PATIENT INSURANCE IS NOT COVERING HIS HUMALOG QUICKPIN. HE NEEDS US TO PRESCRIBE SOMETHING ELSE THAT INSURANCE WILL COVER AND WONT BE EXPENSIVE OUT OF HIS POCKET. YOU CAN REACH PATIENT BACK [phone].

## 2018-04-09 NOTE — TELEPHONE ENCOUNTER
Please call to follow up with him to see how his sugars are running and if he was able to get his medication. Thanks

## 2018-04-10 NOTE — TELEPHONE ENCOUNTER
Called and informed pt, per pt he already picked up Novolin and started using it along with Estella, his BS this morning was at 126, pt wanted to let you know that it is working really well for him. ANDREAS

## 2018-04-17 DIAGNOSIS — I10 BENIGN ESSENTIAL HYPERTENSION: ICD-10-CM

## 2018-04-17 RX ORDER — LISINOPRIL 40 MG/1
TABLET ORAL
Qty: 90 TABLET | Refills: 0 | Status: SHIPPED | OUTPATIENT
Start: 2018-04-17 | End: 2018-07-16 | Stop reason: SDUPTHER

## 2018-06-15 RX ORDER — BUTALBITAL, ACETAMINOPHEN, CAFFEINE AND CODEINE PHOSPHATE 300; 50; 40; 30 MG/1; MG/1; MG/1; MG/1
CAPSULE ORAL
Qty: 30 CAPSULE | Refills: 0 | OUTPATIENT
Start: 2018-06-15 | End: 2018-09-17

## 2018-06-15 NOTE — TELEPHONE ENCOUNTER
Patient stated that he doesn't take these frequently maybe 3-4 a month.   On occasion he will take 2 but normally just 1 at a time.

## 2018-06-15 NOTE — TELEPHONE ENCOUNTER
Please find out how often he has been taking this and how many at a time so I can discuss refill with MD.

## 2018-06-15 NOTE — TELEPHONE ENCOUNTER
Spoke with Dr Brandt and she approved rx of #30 with no refill, to last him for at least 3 months- please call this in. Per our policy, he will have to start coming in every 3-4 months to get a new written prescription. Called in today because he was seen 2 months ago.    The patient has read and signed the [facility] Controlled Substance Contract.  I will continue to see patient for regular follow up appointments.  They are well controlled on their medication.  RAMYA is updated every 3 months. The patient is aware of the potential for addiction and dependence.

## 2018-06-18 ENCOUNTER — TELEPHONE (OUTPATIENT)
Dept: INTERNAL MEDICINE | Facility: CLINIC | Age: 48
End: 2018-06-18

## 2018-06-18 NOTE — TELEPHONE ENCOUNTER
Pt called stating that he did not get the correct medication of the firorecet, he received caffeine not the one with codeine. Spoke with pharmacist and the pharmacist who took the message last week did not put with codeine so they changed medication and they are getting it ready for pt.   Attempted to call pt, no answer, will try again later.

## 2018-06-27 DIAGNOSIS — Z79.4 TYPE 2 DIABETES MELLITUS WITHOUT COMPLICATION, WITH LONG-TERM CURRENT USE OF INSULIN (HCC): ICD-10-CM

## 2018-06-27 DIAGNOSIS — E11.9 TYPE 2 DIABETES MELLITUS WITHOUT COMPLICATION, WITH LONG-TERM CURRENT USE OF INSULIN (HCC): ICD-10-CM

## 2018-06-27 RX ORDER — INSULIN GLARGINE 300 U/ML
28 INJECTION, SOLUTION SUBCUTANEOUS
Qty: 1.5 ML | Refills: 2 | Status: SHIPPED | OUTPATIENT
Start: 2018-06-27 | End: 2018-10-29 | Stop reason: SDUPTHER

## 2018-07-16 DIAGNOSIS — I10 BENIGN ESSENTIAL HYPERTENSION: ICD-10-CM

## 2018-07-16 RX ORDER — LISINOPRIL 40 MG/1
TABLET ORAL
Qty: 30 TABLET | Refills: 0 | Status: SHIPPED | OUTPATIENT
Start: 2018-07-16 | End: 2018-08-20 | Stop reason: SDUPTHER

## 2018-08-17 ENCOUNTER — OFFICE VISIT (OUTPATIENT)
Dept: INTERNAL MEDICINE | Facility: CLINIC | Age: 48
End: 2018-08-17

## 2018-08-17 VITALS
HEART RATE: 89 BPM | SYSTOLIC BLOOD PRESSURE: 142 MMHG | BODY MASS INDEX: 33.33 KG/M2 | OXYGEN SATURATION: 98 % | DIASTOLIC BLOOD PRESSURE: 78 MMHG | WEIGHT: 225 LBS | HEIGHT: 69 IN

## 2018-08-17 DIAGNOSIS — E78.5 HYPERLIPIDEMIA, UNSPECIFIED HYPERLIPIDEMIA TYPE: ICD-10-CM

## 2018-08-17 DIAGNOSIS — E11.9 TYPE 2 DIABETES MELLITUS WITHOUT COMPLICATION, WITH LONG-TERM CURRENT USE OF INSULIN (HCC): ICD-10-CM

## 2018-08-17 DIAGNOSIS — Z00.00 HEALTHCARE MAINTENANCE: Primary | ICD-10-CM

## 2018-08-17 DIAGNOSIS — Z79.4 TYPE 2 DIABETES MELLITUS WITHOUT COMPLICATION, WITH LONG-TERM CURRENT USE OF INSULIN (HCC): ICD-10-CM

## 2018-08-17 LAB
25(OH)D3 SERPL-MCNC: 22.2 NG/ML
ALBUMIN SERPL-MCNC: 4.4 G/DL (ref 3.2–4.8)
ALBUMIN/GLOB SERPL: 1.7 G/DL (ref 1.5–2.5)
ALP SERPL-CCNC: 75 U/L (ref 25–100)
ALT SERPL W P-5'-P-CCNC: 48 U/L (ref 7–40)
ANION GAP SERPL CALCULATED.3IONS-SCNC: 7 MMOL/L (ref 3–11)
ARTICHOKE IGE QN: 126 MG/DL (ref 0–130)
AST SERPL-CCNC: 36 U/L (ref 0–33)
BASOPHILS # BLD AUTO: 0.11 10*3/MM3 (ref 0–0.2)
BASOPHILS NFR BLD AUTO: 1.4 % (ref 0–1)
BILIRUB SERPL-MCNC: 0.6 MG/DL (ref 0.3–1.2)
BUN BLD-MCNC: 16 MG/DL (ref 9–23)
BUN/CREAT SERPL: 14.3 (ref 7–25)
CALCIUM SPEC-SCNC: 9.7 MG/DL (ref 8.7–10.4)
CHLORIDE SERPL-SCNC: 101 MMOL/L (ref 99–109)
CHOLEST SERPL-MCNC: 198 MG/DL (ref 0–200)
CO2 SERPL-SCNC: 29 MMOL/L (ref 20–31)
CREAT BLD-MCNC: 1.12 MG/DL (ref 0.6–1.3)
DEPRECATED RDW RBC AUTO: 42.5 FL (ref 37–54)
EOSINOPHIL # BLD AUTO: 0.45 10*3/MM3 (ref 0–0.3)
EOSINOPHIL NFR BLD AUTO: 5.7 % (ref 0–3)
ERYTHROCYTE [DISTWIDTH] IN BLOOD BY AUTOMATED COUNT: 12.8 % (ref 11.3–14.5)
GFR SERPL CREATININE-BSD FRML MDRD: 70 ML/MIN/1.73
GLOBULIN UR ELPH-MCNC: 2.6 GM/DL
GLUCOSE BLD-MCNC: 147 MG/DL (ref 70–100)
GLUCOSE BLDC GLUCOMTR-MCNC: 122 MG/DL (ref 70–130)
HBA1C MFR BLD: 8.3 %
HCT VFR BLD AUTO: 46.3 % (ref 38.9–50.9)
HDLC SERPL-MCNC: 46 MG/DL (ref 40–60)
HGB BLD-MCNC: 15.7 G/DL (ref 13.1–17.5)
IMM GRANULOCYTES # BLD: 0.02 10*3/MM3 (ref 0–0.03)
IMM GRANULOCYTES NFR BLD: 0.3 % (ref 0–0.6)
LYMPHOCYTES # BLD AUTO: 3.1 10*3/MM3 (ref 0.6–4.8)
LYMPHOCYTES NFR BLD AUTO: 39 % (ref 24–44)
MCH RBC QN AUTO: 31 PG (ref 27–31)
MCHC RBC AUTO-ENTMCNC: 33.9 G/DL (ref 32–36)
MCV RBC AUTO: 91.5 FL (ref 80–99)
MONOCYTES # BLD AUTO: 0.6 10*3/MM3 (ref 0–1)
MONOCYTES NFR BLD AUTO: 7.6 % (ref 0–12)
NEUTROPHILS # BLD AUTO: 3.68 10*3/MM3 (ref 1.5–8.3)
NEUTROPHILS NFR BLD AUTO: 46.3 % (ref 41–71)
PLATELET # BLD AUTO: 290 10*3/MM3 (ref 150–450)
PMV BLD AUTO: 9.7 FL (ref 6–12)
POTASSIUM BLD-SCNC: 4.8 MMOL/L (ref 3.5–5.5)
PROT SERPL-MCNC: 7 G/DL (ref 5.7–8.2)
PSA SERPL-MCNC: 0.69 NG/ML (ref 0–4)
RBC # BLD AUTO: 5.06 10*6/MM3 (ref 4.2–5.76)
SODIUM BLD-SCNC: 137 MMOL/L (ref 132–146)
TRIGL SERPL-MCNC: 180 MG/DL (ref 0–150)
TSH SERPL DL<=0.05 MIU/L-ACNC: 5.76 MIU/ML (ref 0.35–5.35)
WBC NRBC COR # BLD: 7.94 10*3/MM3 (ref 3.5–10.8)

## 2018-08-17 PROCEDURE — 80053 COMPREHEN METABOLIC PANEL: CPT | Performed by: PHYSICIAN ASSISTANT

## 2018-08-17 PROCEDURE — 99396 PREV VISIT EST AGE 40-64: CPT | Performed by: PHYSICIAN ASSISTANT

## 2018-08-17 PROCEDURE — 80061 LIPID PANEL: CPT | Performed by: PHYSICIAN ASSISTANT

## 2018-08-17 PROCEDURE — 82947 ASSAY GLUCOSE BLOOD QUANT: CPT | Performed by: PHYSICIAN ASSISTANT

## 2018-08-17 PROCEDURE — 83036 HEMOGLOBIN GLYCOSYLATED A1C: CPT | Performed by: PHYSICIAN ASSISTANT

## 2018-08-17 PROCEDURE — 84443 ASSAY THYROID STIM HORMONE: CPT | Performed by: PHYSICIAN ASSISTANT

## 2018-08-17 PROCEDURE — 85025 COMPLETE CBC W/AUTO DIFF WBC: CPT | Performed by: PHYSICIAN ASSISTANT

## 2018-08-17 PROCEDURE — G0103 PSA SCREENING: HCPCS | Performed by: PHYSICIAN ASSISTANT

## 2018-08-17 PROCEDURE — 82306 VITAMIN D 25 HYDROXY: CPT | Performed by: PHYSICIAN ASSISTANT

## 2018-08-17 NOTE — PROGRESS NOTES
"Chief Complaint   Patient presents with   • Annual Exam       Subjective   Sina Reyez is a 48 y.o. male.       History of Present Illness     The patient is being seen for a health maintenance evaluation.  The last health maintenance was unknown year(s) ago.    Social History  Sina  does not smoke cigarettes.   He drinks occasional alcohol. On average 4-5 drinks a week. Usually one in a day.  He does not use illicit drugs.    General History  Sina  does have regular dental visits.  He does complain of vision problems. Wears glasses. Last eye exam was summer 2018. Goes to [employer] in [place].  Immunizations are up to date. The patient needs the following immunizations: none    Lifestyle  Sina  consumes a in general, a \"healthy\" diet  .  He exercises daily.    Reproductive Health  Sina  is sexually active. His contraceptive plan is IUD for his wife.   He does not have erectile dysfunction. Used to take cialis but no longer needed.    Screening  Last PSA was 2015.  Last prostate exam was  2015. No family history of prostate cancer.  Last testicular exam was 2015.  Last colonoscopy was never. No family history of colon cancer.      Pt checks his blood pressure at home and it is generally 120s systolic. Has been on lisinopril for several years.                   Current Outpatient Prescriptions:   •  Butalbital-APAP-Caff-Cod -12-30 MG capsule, TAKE ONE CAPSULE BY MOUTH DAILY AS NEEDED AT ONSET OF HEADACHE, Disp: 30 capsule, Rfl: 0  •  Cetirizine HCl (ZYRTEC ALLERGY) 10 MG capsule, Take 1 capsule by mouth As Needed., Disp: , Rfl:   •  CIALIS 5 MG tablet, TAKE ONE TABLET BY MOUTH DAILY, Disp: 30 tablet, Rfl: 2  •  insulin regular (NOVOLIN R RELION) 100 UNIT/ML injection, Inject 10 Units under the skin 3 (Three) Times a Day Before Meals., Disp: 10 mL, Rfl: 12  •  lisinopril (PRINIVIL,ZESTRIL) 40 MG tablet, TAKE ONE TABLET BY MOUTH DAILY, Disp: 30 tablet, Rfl: 0  •  naproxen sodium (ALEVE) 220 MG " "tablet, Take 1 tablet by mouth As Needed., Disp: , Rfl:   •  ondansetron ODT (ZOFRAN-ODT) 4 MG disintegrating tablet, Take 1 tablet by mouth Every 6 (Six) Hours As Needed for nausea or vomiting., Disp: 10 tablet, Rfl: 0  •  TOUJEO SOLOSTAR 300 UNIT/ML solution pen-injector, INJECT 28 UNITS UNDER THE SKIN EVERY NIGHT AT BEDTIME, Disp: 1.5 mL, Rfl: 2     PMFSH  The following portions of the patient's history were reviewed and updated as appropriate: allergies, current medications, past family history, past medical history, past social history, past surgical history and problem list.    Review of Systems   Constitutional: Negative for activity change, appetite change, fatigue, fever and unexpected weight change.   HENT: Negative for congestion, ear pain, facial swelling, rhinorrhea and sore throat.    Eyes: Negative for pain and visual disturbance.   Respiratory: Negative for chest tightness, shortness of breath and wheezing.    Cardiovascular: Negative for chest pain and palpitations.   Gastrointestinal: Negative for abdominal pain and blood in stool.   Endocrine: Negative.    Genitourinary: Negative for difficulty urinating, dysuria and hematuria.   Musculoskeletal: Negative for arthralgias, joint swelling and myalgias.   Neurological: Negative for dizziness, tremors, seizures, syncope, weakness, light-headedness and headaches.   Hematological: Negative for adenopathy.   Psychiatric/Behavioral: Negative.  Negative for dysphoric mood. The patient is not nervous/anxious.        Objective   /78   Pulse 89   Ht 175.3 cm (69\")   Wt 102 kg (225 lb)   SpO2 98%   BMI 33.23 kg/m²     Physical Exam   Constitutional: He is oriented to person, place, and time. He appears well-developed and well-nourished. No distress.   HENT:   Head: Normocephalic and atraumatic. Hair is normal.   Right Ear: Hearing, tympanic membrane, external ear and ear canal normal.   Left Ear: Hearing, tympanic membrane, external ear and ear " canal normal.   Nose: Nose normal. No sinus tenderness or nasal deformity.   Mouth/Throat: Uvula is midline, oropharynx is clear and moist and mucous membranes are normal. No oral lesions. No uvula swelling.   Eyes: Pupils are equal, round, and reactive to light. Conjunctivae, EOM and lids are normal. Right eye exhibits no discharge. Left eye exhibits no discharge. No scleral icterus. Right eye exhibits normal extraocular motion and no nystagmus. Left eye exhibits normal extraocular motion and no nystagmus.   Fundoscopic exam:       The right eye shows red reflex.        The left eye shows red reflex.   Neck: Normal range of motion. Neck supple. No JVD present. No tracheal deviation present. No thyromegaly present.   Cardiovascular: Normal rate, regular rhythm, normal heart sounds, intact distal pulses and normal pulses.  Exam reveals no gallop.    No murmur heard.  Pulmonary/Chest: Effort normal and breath sounds normal. No respiratory distress. He has no decreased breath sounds. He has no wheezes. He has no rhonchi. He has no rales. He exhibits no tenderness.   Abdominal: Soft. Bowel sounds are normal. He exhibits no distension and no mass. There is no tenderness. There is no guarding. No hernia.   Genitourinary: Rectum normal and prostate normal.   Musculoskeletal: Normal range of motion. He exhibits no edema, tenderness or deformity.   Lymphadenopathy:     He has no cervical adenopathy.   Neurological: He is alert and oriented to person, place, and time. He has normal reflexes. He displays normal reflexes. No cranial nerve deficit. He exhibits normal muscle tone. Coordination normal.   Skin: Skin is warm and dry. No rash noted. He is not diaphoretic.   Psychiatric: He has a normal mood and affect. His behavior is normal. Judgment and thought content normal.   Nursing note and vitals reviewed.      Results for orders placed or performed in visit on 08/17/18   Comprehensive Metabolic Panel   Result Value Ref Range     Glucose 147 (H) 70 - 100 mg/dL    BUN 16 9 - 23 mg/dL    Creatinine 1.12 0.60 - 1.30 mg/dL    Sodium 137 132 - 146 mmol/L    Potassium 4.8 3.5 - 5.5 mmol/L    Chloride 101 99 - 109 mmol/L    CO2 29.0 20.0 - 31.0 mmol/L    Calcium 9.7 8.7 - 10.4 mg/dL    Total Protein 7.0 5.7 - 8.2 g/dL    Albumin 4.40 3.20 - 4.80 g/dL    ALT (SGPT) 48 (H) 7 - 40 U/L    AST (SGOT) 36 (H) 0 - 33 U/L    Alkaline Phosphatase 75 25 - 100 U/L    Total Bilirubin 0.6 0.3 - 1.2 mg/dL    eGFR Non African Amer 70 >60 mL/min/1.73    Globulin 2.6 gm/dL    A/G Ratio 1.7 1.5 - 2.5 g/dL    BUN/Creatinine Ratio 14.3 7.0 - 25.0    Anion Gap 7.0 3.0 - 11.0 mmol/L   Lipid Panel   Result Value Ref Range    Total Cholesterol 198 0 - 200 mg/dL    Triglycerides 180 (H) 0 - 150 mg/dL    HDL Cholesterol 46 40 - 60 mg/dL    LDL Cholesterol  126 0 - 130 mg/dL   TSH   Result Value Ref Range    TSH 5.759 (H) 0.350 - 5.350 mIU/mL   Vitamin D 25 Hydroxy   Result Value Ref Range    25 Hydroxy, Vitamin D 22.2 ng/ml   PSA Screen   Result Value Ref Range    PSA 0.690 0.000 - 4.000 ng/mL   CBC Auto Differential   Result Value Ref Range    WBC 7.94 3.50 - 10.80 10*3/mm3    RBC 5.06 4.20 - 5.76 10*6/mm3    Hemoglobin 15.7 13.1 - 17.5 g/dL    Hematocrit 46.3 38.9 - 50.9 %    MCV 91.5 80.0 - 99.0 fL    MCH 31.0 27.0 - 31.0 pg    MCHC 33.9 32.0 - 36.0 g/dL    RDW 12.8 11.3 - 14.5 %    RDW-SD 42.5 37.0 - 54.0 fl    MPV 9.7 6.0 - 12.0 fL    Platelets 290 150 - 450 10*3/mm3    Neutrophil % 46.3 41.0 - 71.0 %    Lymphocyte % 39.0 24.0 - 44.0 %    Monocyte % 7.6 0.0 - 12.0 %    Eosinophil % 5.7 (H) 0.0 - 3.0 %    Basophil % 1.4 (H) 0.0 - 1.0 %    Immature Grans % 0.3 0.0 - 0.6 %    Neutrophils, Absolute 3.68 1.50 - 8.30 10*3/mm3    Lymphocytes, Absolute 3.10 0.60 - 4.80 10*3/mm3    Monocytes, Absolute 0.60 0.00 - 1.00 10*3/mm3    Eosinophils, Absolute 0.45 (H) 0.00 - 0.30 10*3/mm3    Basophils, Absolute 0.11 0.00 - 0.20 10*3/mm3    Immature Grans, Absolute 0.02 0.00 - 0.03  10*3/mm3   POCT Glucose   Result Value Ref Range    Glucose 122 70 - 130 mg/dL   POC Glycosylated Hemoglobin (Hb A1C)   Result Value Ref Range    Hemoglobin A1C 8.3 %        ASSESSMENT/PLAN    Problem List Items Addressed This Visit        Cardiovascular and Mediastinum    Hyperlipidemia    Relevant Orders    Comprehensive Metabolic Panel (Completed)    Lipid Panel (Completed)       Endocrine    Type 2 diabetes mellitus (CMS/HCC)     Diabetes is improving with treatment.   Continue current treatment regimen.  Dietary recommendations for ADA diet.  Regular aerobic exercise.  Pending appt with Endocrionology.  Diabetes will be reassessed at Endocrinology appointment..         Relevant Orders    POCT Glucose (Completed)    POC Glycosylated Hemoglobin (Hb A1C) (Completed)       Other    Healthcare maintenance - Primary     Immunizations: up to date  Eye exam: done summer 2018, due yearly  Prostate exam: done 2015, due age 49  PSA: ordered  Colonoscopy: due age 49  Labs: fasting labs ordered           Relevant Orders    Comprehensive Metabolic Panel (Completed)    CBC & Differential (Completed)    Lipid Panel (Completed)    TSH (Completed)    Vitamin D 25 Hydroxy (Completed)    PSA Screen (Completed)    CBC Auto Differential (Completed)               Return in about 1 year (around 8/17/2019) for Annual.

## 2018-08-19 PROBLEM — Z00.00 HEALTHCARE MAINTENANCE: Status: ACTIVE | Noted: 2018-08-19

## 2018-08-20 DIAGNOSIS — I10 BENIGN ESSENTIAL HYPERTENSION: ICD-10-CM

## 2018-08-20 RX ORDER — LISINOPRIL 40 MG/1
TABLET ORAL
Qty: 30 TABLET | Refills: 3 | Status: SHIPPED | OUTPATIENT
Start: 2018-08-20 | End: 2018-12-17 | Stop reason: SDUPTHER

## 2018-08-20 NOTE — ASSESSMENT & PLAN NOTE
Immunizations: up to date  Eye exam: done summer 2018, due yearly  Prostate exam: done 2015, due age 49  PSA: ordered  Colonoscopy: due age 49  Labs: fasting labs ordered

## 2018-08-20 NOTE — ASSESSMENT & PLAN NOTE
Diabetes is improving with treatment.   Continue current treatment regimen.  Dietary recommendations for ADA diet.  Regular aerobic exercise.  Pending appt with Endocrionology.  Diabetes will be reassessed at Endocrinology appointment..

## 2018-08-21 DIAGNOSIS — G43.009 MIGRAINE WITHOUT AURA AND WITHOUT STATUS MIGRAINOSUS, NOT INTRACTABLE: ICD-10-CM

## 2018-08-21 DIAGNOSIS — I10 BENIGN ESSENTIAL HYPERTENSION: Primary | ICD-10-CM

## 2018-08-21 RX ORDER — BUTALBITAL, ACETAMINOPHEN, CAFFEINE AND CODEINE PHOSPHATE 300; 50; 40; 30 MG/1; MG/1; MG/1; MG/1
CAPSULE ORAL
Qty: 30 CAPSULE | Refills: 0 | OUTPATIENT
Start: 2018-08-21

## 2018-08-21 RX ORDER — HYDROCHLOROTHIAZIDE 25 MG/1
25 TABLET ORAL DAILY
Qty: 30 TABLET | Refills: 5 | Status: SHIPPED | OUTPATIENT
Start: 2018-08-21 | End: 2018-09-17

## 2018-08-21 NOTE — TELEPHONE ENCOUNTER
I sent in the hydrochlorthiazide 25 mg for him to start.    He is not due for refill of the Fioricet until 9/20. The #30 he was given in June should have lasted 3 months if he takes them once a week.

## 2018-08-21 NOTE — TELEPHONE ENCOUNTER
PATIENT MONITORED BLOOD PRESSURE OVER THE WEEKEND AND IT HAS BEEN IN  RANGE. LEFTY TOLD PATIENT SHE WOULD PROBABLY  PRESCRIBE HIM A WATER PILL DEPENDING ON BLOOD PRESSURE RESULTS. HE ALSO NEEDS A REFILL ON HIS BUTALBITAL APAP CAFF COD -56-30 MG CAPSULE.

## 2018-08-22 RX ORDER — BUTALBITAL, ACETAMINOPHEN, CAFFEINE AND CODEINE PHOSPHATE 300; 50; 40; 30 MG/1; MG/1; MG/1; MG/1
CAPSULE ORAL
Qty: 30 CAPSULE | Refills: 0 | OUTPATIENT
Start: 2018-08-22

## 2018-08-22 NOTE — TELEPHONE ENCOUNTER
Called and informed pt, per pt the sig on script is TAKE ONE CAPSULE BY MOUTH DAILY AS NEEDED AT ONSET OF HEADACHE, so he has been taking as needed for the headache not once a week, he only has 5 pills left and stated that he was advised to call ahead of time for a refill, pls advise.

## 2018-08-23 ENCOUNTER — PATIENT MESSAGE (OUTPATIENT)
Dept: INTERNAL MEDICINE | Facility: CLINIC | Age: 48
End: 2018-08-23

## 2018-08-23 NOTE — TELEPHONE ENCOUNTER
I sent him a note on MyChart explaining that I am referring him to Neurology for treatment of his migraines since he is requiring Fioricet on such a frequent basis. We can bridge his prescription until the appointment if needed.

## 2018-08-24 ENCOUNTER — TELEPHONE (OUTPATIENT)
Dept: INTERNAL MEDICINE | Facility: CLINIC | Age: 48
End: 2018-08-24

## 2018-08-24 NOTE — TELEPHONE ENCOUNTER
Looks like he needs to call the neurology office to reschedule his appointment since he was scheduled previously and cancelled. He can call [phone] to reschedule. When he has appt date, can fill med to last until then.

## 2018-08-24 NOTE — TELEPHONE ENCOUNTER
MR DAVID CALLED TO FOLLOW UP ON PREVIOUS EMAIL REGARDING FIORICET RX. HE WANTED TO BE SURE THAT THIS MEDICATION WOULD BE ORDERED FOR HIM TO LAST UNTIL FUTURE REFERRAL APPT.

## 2018-08-24 NOTE — TELEPHONE ENCOUNTER
Called and informed pt, pt voiced understanding stated will call and schedule and will call back to let us know when is the appt.     Pt called back and states that he has scheduled his appt with Neurology on September 17th, pt is leaving [facility] for the next 10-12 days and would really like to  prescription today, pls advise.

## 2018-08-24 NOTE — TELEPHONE ENCOUNTER
Spoke with Dr Brandt, on call provider, and reviewed Kris. Okay to call in Fioricet with codeine 1-2 po 2 times a week prn headache #15, 0RF to last him until his appt with Neurology.

## 2018-08-27 NOTE — TELEPHONE ENCOUNTER
From: Sina Reyez  Sent: 8/23/2018 2:43 PM EDT  To: SHALINI Mason  Subject: RE:Fioricet    Hi Dr. Mandel,     Thanks. They really fluctuate. Having a small amount on hand would be great.     Appreciate it -   ----- Message -----  From: SHALINI Frank  Sent: 8/23/2018 2:10 PM EDT  To: Sina Reyez  Subject: Fioricet  Sina,    According to my calculations, it seems you are needing to take the Fioricet an average of about 5 times a week (it may be 2 at a time per headache). Unfortunately, this is more than I feel comfortable prescribing and it also makes me think that the underlying cause of your headaches may be worsening. I would like you to see Neurology and if they feel Fioricet is the best method of treatment for your headaches, they can continue to prescribe it.    I will put in the referral and if you need a small amount of Fiorcet to bridge you to that appointment, we will be able to accommodate that.     Sincerely,  Julia

## 2018-08-28 NOTE — PROGRESS NOTES
Your recent labs showed that your TSH is slightly elevated. This could mean that your thyroid is underactive. Since it is only mildly elevated and we need to draw some additional thyroid labs to verify it, I would suggest that you discuss it at your next Endocrinology appointment.    Your triglycerides are much improved, although your liver enzymes are slightly elevated- we will watch these as well.    Your vitamin D is low. Please start a daily dose of vitamin D 2,000 units to boost your level.    Everything else looks fine.

## 2018-08-30 RX ORDER — BUTALBITAL, ACETAMINOPHEN, CAFFEINE AND CODEINE PHOSPHATE 300; 50; 40; 30 MG/1; MG/1; MG/1; MG/1
CAPSULE ORAL
Qty: 2 CAPSULE | Refills: 0 | OUTPATIENT
Start: 2018-08-30

## 2018-09-17 ENCOUNTER — OFFICE VISIT (OUTPATIENT)
Dept: NEUROLOGY | Facility: CLINIC | Age: 48
End: 2018-09-17

## 2018-09-17 VITALS
WEIGHT: 225 LBS | HEIGHT: 69 IN | BODY MASS INDEX: 33.33 KG/M2 | SYSTOLIC BLOOD PRESSURE: 117 MMHG | DIASTOLIC BLOOD PRESSURE: 82 MMHG

## 2018-09-17 DIAGNOSIS — G44.229 CHRONIC TENSION-TYPE HEADACHE, NOT INTRACTABLE: Primary | ICD-10-CM

## 2018-09-17 PROCEDURE — 99204 OFFICE O/P NEW MOD 45 MIN: CPT | Performed by: PSYCHIATRY & NEUROLOGY

## 2018-09-17 RX ORDER — BUTALBITAL, ACETAMINOPHEN, CAFFEINE AND CODEINE PHOSPHATE 300; 50; 40; 30 MG/1; MG/1; MG/1; MG/1
1 CAPSULE ORAL AS NEEDED
Qty: 30 CAPSULE | Refills: 0 | Status: SHIPPED | OUTPATIENT
Start: 2018-09-17 | End: 2018-10-26 | Stop reason: SDUPTHER

## 2018-09-17 RX ORDER — AMITRIPTYLINE HYDROCHLORIDE 10 MG/1
10 TABLET, FILM COATED ORAL NIGHTLY
Qty: 30 TABLET | Refills: 0 | Status: SHIPPED | OUTPATIENT
Start: 2018-09-17 | End: 2018-09-27 | Stop reason: SDUPTHER

## 2018-09-17 NOTE — PROGRESS NOTES
Subjective:    CC: Sina Reyez is seen today in consultation at the request of SHALINI Herron for Headache       HPI:  Patient is a 48-year-old male with past medical history of type 2 diabetes, headaches referred to the clinic for evaluation and management of headaches.  He reports that he started having headaches approximately 40 years ago.  It would come and go throughout the year.  In the last 6 months to an year, they have become more frequent and intense.  Currently he is reporting approximately 25 headache days in a month.  He reports dull aching type of pain or throbbing type of pain in back of the head, shoulder region with pain intensity being the 4-5 out of 10 almost on a daily basis.  Sometimes the headache becomes really intense and would radiate to involve top of the head and also the front of the head bilaterally.  This pain is described as 8-9 out of 10.  He denies any associated the sensitivity to light, sound or nausea or vomiting.  He reports that he sleeps well.  He reports that his mood is good.  He currently takes Fioricet as needed for really bad headache and it helps as an abortive therapy.  He has never tried any preventative therapy in the past.  He has had MRI and EEG in the past which were unremarkable.    The following portions of the patient's history were reviewed today and updated as of 09/17/2018  : allergies, social history and problem list.  This document will be scanned to patient's chart.      Current Outpatient Prescriptions:   •  insulin regular (NOVOLIN R RELION) 100 UNIT/ML injection, Inject 10 Units under the skin 3 (Three) Times a Day Before Meals., Disp: 10 mL, Rfl: 12  •  lisinopril (PRINIVIL,ZESTRIL) 40 MG tablet, TAKE ONE TABLET BY MOUTH DAILY, Disp: 30 tablet, Rfl: 3  •  TOUJEO SOLOSTAR 300 UNIT/ML solution pen-injector, INJECT 28 UNITS UNDER THE SKIN EVERY NIGHT AT BEDTIME, Disp: 1.5 mL, Rfl: 2  •  amitriptyline (ELAVIL) 10 MG tablet, Take 1 tablet by mouth  "Every Night., Disp: 30 tablet, Rfl: 0  •  Butalbital-APAP-Caff-Cod -87-30 MG capsule, Take 1 tablet by mouth As Needed (HEADACHE) for up to 30 days., Disp: 30 capsule, Rfl: 0   Past Medical History:   Diagnosis Date   • Acute sinusitis    • Acute stress disorder    • Anxiety    • Diabetes mellitus (CMS/HCC)    • Hypertension    • Migraine    • MRSA infection    • Rib pain on left side    • Toe injury    • Verruca    • Viral gastroenteritis       No past surgical history on file.   Family History   Problem Relation Age of Onset   • Transient ischemic attack Mother    • Cancer Mother    • Dementia Mother    • Stroke Father       Review of Systems   Constitutional: Negative.    HENT: Negative.    Eyes: Positive for itching.   Respiratory: Negative.    Cardiovascular: Negative.    Musculoskeletal: Negative.    Skin: Negative.    Allergic/Immunologic: Negative.    Neurological: Negative.    Psychiatric/Behavioral: Negative.        All other systems reviewed and are negative     Objective:    /82   Ht 175.3 cm (69.02\")   Wt 102 kg (225 lb)   BMI 33.21 kg/m²     Neurology Exam:    General apperance: NAD.     Mental status: Alert, awake and oriented to time place and person.    Recent and Remote memory: Can recall 3/3 objects at 5 minutes. Can recall historical events.     Attention span and Concentration: Serial 7s: Normal.     Fund of knowledge:  Normal.     Language and Speech: No aphasia or dysarthria.    Naming , Repitition and Comprehension:  Can name objects, repeat a sentence and follow commands. Speech is clear and fluent with good repetition, comprehension, and naming.    Cranial Nerves:   CN II: Visual fields are full. Intact. Fundi - Normal, No papillederma, Pupils - AVERY  CN III, IV and VI: Extraocular movements are intact. Normal saccades.   CN V: Facial sensation is intact.   CN VII: Muscles of facial expression reveal no asymmetry. Intact.   CN VIII: Hearing is intact. Whispered voice intact. "   CN IX and X: Palate elevates symmetrically. Intact  CN XI: Shoulder shrug is intact.   CN XII: Tongue is midline without evidence of atrophy or fasciculation.     Motor:  Right UE muscle strength 5/5. Normal tone.     Left UE muscle strength 5/5. Normal tone.      Right LE muscle strength5/5. Normal tone.     Left LE muscle strength 5/5. Normal tone.      Sensory: Normal light touch, vibration and pinprick sensation bilaterally.    DTRs: 2+ bilaterally in upper and lower extremities.    Babinski: Negative bilaterally.    Co-ordination: Normal finger-to-nose, heel to shin B/L.    Rhomberg: Negative.    Gait: Normal.    Cardiovascular: Regular rate and rhythm without murmur, gallop or rub.    Assessment and Plan:  1. Chronic tension-type headache, not intractable  Currently is reporting the 25 headache days in a month.  Will start the amitriptyline 10 mg at bedtime as a preventative therapy.  I've advised him to call office with response in 2 weeks.  Continue Fioricet as needed as an abortive therapy.  I've advised him not to use more than twice in a week to avoid getting rebound headaches.        No Follow-up on file.     Zach Machado MD

## 2018-09-27 RX ORDER — AMITRIPTYLINE HYDROCHLORIDE 10 MG/1
20 TABLET, FILM COATED ORAL NIGHTLY
Qty: 60 TABLET | Refills: 1 | Status: SHIPPED | OUTPATIENT
Start: 2018-09-27 | End: 2018-12-03 | Stop reason: SDUPTHER

## 2018-10-26 RX ORDER — BUTALBITAL, ACETAMINOPHEN, CAFFEINE AND CODEINE PHOSPHATE 300; 50; 40; 30 MG/1; MG/1; MG/1; MG/1
CAPSULE ORAL
Qty: 2 CAPSULE | Refills: 0 | Status: SHIPPED | OUTPATIENT
Start: 2018-10-26 | End: 2019-01-23 | Stop reason: SDUPTHER

## 2018-10-26 RX ORDER — BUTALBITAL, ACETAMINOPHEN, CAFFEINE AND CODEINE PHOSPHATE 50; 325; 40; 30 MG/1; MG/1; MG/1; MG/1
CAPSULE ORAL
Qty: 4 CAPSULE | Refills: 0 | OUTPATIENT
Start: 2018-10-26

## 2018-10-29 DIAGNOSIS — E11.9 TYPE 2 DIABETES MELLITUS WITHOUT COMPLICATION, WITH LONG-TERM CURRENT USE OF INSULIN (HCC): ICD-10-CM

## 2018-10-29 DIAGNOSIS — Z79.4 TYPE 2 DIABETES MELLITUS WITHOUT COMPLICATION, WITH LONG-TERM CURRENT USE OF INSULIN (HCC): ICD-10-CM

## 2018-10-29 RX ORDER — INSULIN GLARGINE 300 U/ML
28 INJECTION, SOLUTION SUBCUTANEOUS
Qty: 1.5 ML | Refills: 1 | Status: SHIPPED | OUTPATIENT
Start: 2018-10-29 | End: 2019-01-24 | Stop reason: SDUPTHER

## 2018-12-03 RX ORDER — AMITRIPTYLINE HYDROCHLORIDE 10 MG/1
20 TABLET, FILM COATED ORAL NIGHTLY
Qty: 60 TABLET | Refills: 0 | Status: SHIPPED | OUTPATIENT
Start: 2018-12-03 | End: 2019-01-08 | Stop reason: SDUPTHER

## 2018-12-17 DIAGNOSIS — I10 BENIGN ESSENTIAL HYPERTENSION: ICD-10-CM

## 2018-12-17 RX ORDER — LISINOPRIL 40 MG/1
TABLET ORAL
Qty: 30 TABLET | Refills: 2 | Status: SHIPPED | OUTPATIENT
Start: 2018-12-17 | End: 2019-03-18 | Stop reason: SDUPTHER

## 2019-01-08 RX ORDER — AMITRIPTYLINE HYDROCHLORIDE 10 MG/1
TABLET, FILM COATED ORAL
Qty: 60 TABLET | Refills: 0 | Status: SHIPPED | OUTPATIENT
Start: 2019-01-08 | End: 2019-02-07 | Stop reason: SDUPTHER

## 2019-01-22 ENCOUNTER — OFFICE VISIT (OUTPATIENT)
Dept: NEUROLOGY | Facility: CLINIC | Age: 49
End: 2019-01-22

## 2019-01-22 VITALS
WEIGHT: 233 LBS | SYSTOLIC BLOOD PRESSURE: 114 MMHG | HEIGHT: 69 IN | BODY MASS INDEX: 34.51 KG/M2 | DIASTOLIC BLOOD PRESSURE: 78 MMHG

## 2019-01-22 DIAGNOSIS — G44.229 CHRONIC TENSION-TYPE HEADACHE, NOT INTRACTABLE: Primary | ICD-10-CM

## 2019-01-22 PROCEDURE — 99213 OFFICE O/P EST LOW 20 MIN: CPT | Performed by: PSYCHIATRY & NEUROLOGY

## 2019-01-22 NOTE — PROGRESS NOTES
Subjective:    CC: Sina Reyez is in clinic today for follow up for chronic tension type of headaches.      HPI:  He is in clinic for regular follow-up.  Since the last visit, he reports that the with amitriptyline 20 mg at bedtime, headaches have significantly reduced in intensity and frequency.  He is approximately getting one headache in a week but it's not as intense as it used to be.  Before starting the medication, he was getting headache almost every day.  He is tolerating medication well without any side effects.    The following portions of the patient's history were reviewed and updated as of 01/22/2019: allergies, social history and problem list.       Current Outpatient Medications:   •  amitriptyline (ELAVIL) 10 MG tablet, TAKE TWO TABLETS BY MOUTH ONCE NIGHTLY **MUST CALL MD FOR APPOINTMENT, Disp: 60 tablet, Rfl: 0  •  Butalbital-APAP-Caff-Cod -05-30 MG capsule, TAKE ONE CAPSULE BY MOUTH DAILY AS NEEDED FOR HEADACHE, Disp: 2 capsule, Rfl: 0  •  insulin regular (NOVOLIN R RELION) 100 UNIT/ML injection, Inject 10 Units under the skin 3 (Three) Times a Day Before Meals., Disp: 10 mL, Rfl: 12  •  lisinopril (PRINIVIL,ZESTRIL) 40 MG tablet, TAKE ONE TABLET BY MOUTH DAILY, Disp: 30 tablet, Rfl: 2  •  TOUJEO SOLOSTAR 300 UNIT/ML solution pen-injector, INJECT 28 UNITS UNDER THE SKIN EVERY NIGHT AT BEDTIME, Disp: 1.5 mL, Rfl: 1   Past Medical History:   Diagnosis Date   • Acute sinusitis    • Acute stress disorder    • Anxiety    • Diabetes mellitus (CMS/HCC)    • Hypertension    • Migraine    • MRSA infection    • Rib pain on left side    • Toe injury    • Verruca    • Viral gastroenteritis       History reviewed. No pertinent surgical history.   Family History   Problem Relation Age of Onset   • Transient ischemic attack Mother    • Cancer Mother    • Dementia Mother    • Stroke Father         Review of Systems   All other systems reviewed and are negative.    Objective:    /78   Ht 175.3  "cm (69.02\")   Wt 106 kg (233 lb)   BMI 34.39 kg/m²     Neurology Exam:  General apperance: NAD.     Mental status: Alert, awake and oriented to time place and person.    Recent and Remote memory: Can recall 3/3 objects at 5 minutes. Can recall historical events.     Attention span and Concentration: Serial 7s: Normal.     Fund of knowledge:  Normal.     Language and Speech: No aphasia or dysarthria.    Naming , Repitition and Comprehension:  Can name objects, repeat a sentence and follow commands. Speech is clear and fluent with good repetition, comprehension, and naming.    CN II to XII: Intact.    Opthalmoscopic Exam: No papilledema.    Motor:  Right UE muscle strength 5/5. Normal tone.     Left UE muscle strength 5/5. Normal tone.      Right LE muscle strength5/5. Normal tone.     Left LE muscle strength 5/5. Normal tone.      Sensory: Normal light touch, vibration and pinprick sensation bilaterally.    DTRs: 2+ bilaterally.    Babinski: Negative bilaterally.    Co-ordination: Normal finger-to-nose, heel to shin B/L.    Rhomberg: Negative.    Gait: Normal.    Cardiovascular: Regular rate and rhythm without murmur, gallop or rub.    Assessment and Plan:  1. Chronic tension-type headache, not intractable  Doing very well on now amitriptyline 20 mg daily dose.  Headache frequency is reduced from 25 headache days in a month to 2-3 headache days in a month.  He is tolerating medication well without any side effects.  Continue with the same dose.  He takes Fioricet only as needed for the really bad headache.  I'll see him back in 3 months for follow-up.       I spent 15 minutes face to face with the patient and spent 10 minutes of this time counseling and discussing about taking medication regularly, possible side effects with medication use, importance of good sleep hygiene, good hydration and regular exercise.    Return in about 3 months (around 4/22/2019).       "

## 2019-01-23 ENCOUNTER — TELEPHONE (OUTPATIENT)
Dept: NEUROLOGY | Facility: CLINIC | Age: 49
End: 2019-01-23

## 2019-01-23 RX ORDER — BUTALBITAL, ACETAMINOPHEN, CAFFEINE AND CODEINE PHOSPHATE 300; 50; 40; 30 MG/1; MG/1; MG/1; MG/1
1 CAPSULE ORAL AS NEEDED
Qty: 30 CAPSULE | Refills: 0 | Status: SHIPPED | OUTPATIENT
Start: 2019-01-23 | End: 2019-02-22

## 2019-01-23 NOTE — TELEPHONE ENCOUNTER
----- Message from Yamileth Tom, [employer] Rep sent at 1/23/2019  2:24 PM EST -----  Carter    PT called to request a refill on the following medication: Butalbital-APAP-Caff-Cod -04-30 MG capsule [[id number]] (Order [id number]).     PT states that he has a terrible migraine today and needs a refill ASAP.     Please call Sina back with any questions:  [phone]

## 2019-01-24 DIAGNOSIS — Z79.4 TYPE 2 DIABETES MELLITUS WITHOUT COMPLICATION, WITH LONG-TERM CURRENT USE OF INSULIN (HCC): ICD-10-CM

## 2019-01-24 DIAGNOSIS — E11.9 TYPE 2 DIABETES MELLITUS WITHOUT COMPLICATION, WITH LONG-TERM CURRENT USE OF INSULIN (HCC): ICD-10-CM

## 2019-01-24 RX ORDER — INSULIN GLARGINE 300 U/ML
28 INJECTION, SOLUTION SUBCUTANEOUS
Qty: 3 ML | Refills: 2 | Status: SHIPPED | OUTPATIENT
Start: 2019-01-24 | End: 2019-05-29 | Stop reason: SDUPTHER

## 2019-02-07 RX ORDER — AMITRIPTYLINE HYDROCHLORIDE 10 MG/1
TABLET, FILM COATED ORAL
Qty: 60 TABLET | Refills: 2 | Status: SHIPPED | OUTPATIENT
Start: 2019-02-07 | End: 2019-05-07 | Stop reason: SDUPTHER

## 2019-02-18 DIAGNOSIS — I10 BENIGN ESSENTIAL HYPERTENSION: ICD-10-CM

## 2019-02-18 RX ORDER — HYDROCHLOROTHIAZIDE 25 MG/1
TABLET ORAL
Qty: 30 TABLET | Refills: 2 | Status: SHIPPED | OUTPATIENT
Start: 2019-02-18 | End: 2019-05-21 | Stop reason: SDUPTHER

## 2019-02-18 NOTE — TELEPHONE ENCOUNTER
Not sure why it was discontinued from his list on 9/18 at his Neurology appt. I assume he has been taking it all this time. Sent in refill and he needs follow up with me for BP and with Vedrana for Diabetes.

## 2019-03-18 DIAGNOSIS — I10 BENIGN ESSENTIAL HYPERTENSION: ICD-10-CM

## 2019-03-18 RX ORDER — LISINOPRIL 40 MG/1
TABLET ORAL
Qty: 30 TABLET | Refills: 2 | Status: SHIPPED | OUTPATIENT
Start: 2019-03-18 | End: 2019-06-19 | Stop reason: SDUPTHER

## 2019-05-07 ENCOUNTER — OFFICE VISIT (OUTPATIENT)
Dept: NEUROLOGY | Facility: CLINIC | Age: 49
End: 2019-05-07

## 2019-05-07 VITALS
BODY MASS INDEX: 34.51 KG/M2 | SYSTOLIC BLOOD PRESSURE: 118 MMHG | HEIGHT: 69 IN | DIASTOLIC BLOOD PRESSURE: 68 MMHG | WEIGHT: 233 LBS

## 2019-05-07 DIAGNOSIS — G44.229 CHRONIC TENSION-TYPE HEADACHE, NOT INTRACTABLE: Primary | ICD-10-CM

## 2019-05-07 PROCEDURE — 99213 OFFICE O/P EST LOW 20 MIN: CPT | Performed by: PSYCHIATRY & NEUROLOGY

## 2019-05-07 RX ORDER — AMITRIPTYLINE HYDROCHLORIDE 10 MG/1
20 TABLET, FILM COATED ORAL NIGHTLY
Qty: 60 TABLET | Refills: 12 | Status: SHIPPED | OUTPATIENT
Start: 2019-05-07 | End: 2020-06-02 | Stop reason: SDUPTHER

## 2019-05-07 NOTE — PROGRESS NOTES
"Subjective:    CC: Sina Reyez is in clinic today for follow up for chronic tension type of headaches.    HPI:  He is in clinic for regular follow-up.  Since her last visit, he reports that amitriptyline 20 mg daily has helped significantly keeping the headaches under control.  He is currently having one headache every 3 weeks.  It is very mild and he does not help usually take anything over-the-counter as an abortive treatment.  Is tolerating medication well without any side effects.    The following portions of the patient's history were reviewed and updated as of 05/07/2019: allergies, social history and problem list.       Current Outpatient Medications:   •  amitriptyline (ELAVIL) 10 MG tablet, Take 2 tablets by mouth Every Night., Disp: 60 tablet, Rfl: 12  •  hydrochlorothiazide (HYDRODIURIL) 25 MG tablet, TAKE ONE TABLET BY MOUTH DAILY, Disp: 30 tablet, Rfl: 2  •  insulin regular (NOVOLIN R RELION) 100 UNIT/ML injection, Inject 10 Units under the skin 3 (Three) Times a Day Before Meals., Disp: 10 mL, Rfl: 12  •  lisinopril (PRINIVIL,ZESTRIL) 40 MG tablet, TAKE ONE TABLET BY MOUTH DAILY, Disp: 30 tablet, Rfl: 2  •  TOUJEO SOLOSTAR 300 UNIT/ML solution pen-injector, INJECT 28 UNITS UNDER THE SKIN EVERY NIGHT AT BEDTIME, Disp: 3 mL, Rfl: 2   Past Medical History:   Diagnosis Date   • Acute sinusitis    • Acute stress disorder    • Anxiety    • Diabetes mellitus (CMS/HCC)    • Hypertension    • Migraine    • MRSA infection    • Rib pain on left side    • Toe injury    • Verruca    • Viral gastroenteritis       History reviewed. No pertinent surgical history.   Family History   Problem Relation Age of Onset   • Transient ischemic attack Mother    • Cancer Mother    • Dementia Mother    • Stroke Father         Review of Systems   All other systems reviewed and are negative.    Objective:    /68   Ht 175.3 cm (69\")   Wt 106 kg (233 lb)   BMI 34.41 kg/m²     Neurology Exam:  General apperance: NAD. "     Mental status: Alert, awake and oriented to time place and person.    Recent and Remote memory: Can recall 3/3 objects at 5 minutes. Can recall historical events.     Attention span and Concentration: Serial 7s: Normal.     Fund of knowledge:  Normal.     Language and Speech: No aphasia or dysarthria.    Naming , Repitition and Comprehension:  Can name objects, repeat a sentence and follow commands. Speech is clear and fluent with good repetition, comprehension, and naming.    CN II to XII: Intact.    Opthalmoscopic Exam: No papilledema.    Motor:  Right UE muscle strength 5/5. Normal tone.     Left UE muscle strength 5/5. Normal tone.      Right LE muscle strength5/5. Normal tone.     Left LE muscle strength 5/5. Normal tone.      Sensory: Normal light touch, vibration and pinprick sensation bilaterally.    DTRs: 2+ bilaterally.    Babinski: Negative bilaterally.    Co-ordination: Normal finger-to-nose, heel to shin B/L.    Rhomberg: Negative.    Gait: Normal.    Cardiovascular: Regular rate and rhythm without murmur, gallop or rub.    Assessment and Plan:  1. Chronic tension-type headache, not intractable  Doing very well with low-dose amitriptyline 20 mg at bedtime.  Headaches have reduced significantly in intensity and frequency.  He was getting almost daily headaches prior to starting amitriptyline and currently he is getting one headache every 3 weeks.  He is tolerating medication well.  Continue with current dose.  I will see him back in 1 year for follow-up.       I spent 15 minutes face to face with the patient and spent 10 minutes of this time  in management, instructions and education regarding above mentioned diagnosis and also on counseling and discussing about taking medication regularly, possible side effects with medication use, importance of good sleep hygiene, good hydration and regular exercise.    Return in about 1 year (around 5/7/2020).

## 2019-05-21 DIAGNOSIS — I10 BENIGN ESSENTIAL HYPERTENSION: ICD-10-CM

## 2019-05-21 RX ORDER — HYDROCHLOROTHIAZIDE 25 MG/1
TABLET ORAL
Qty: 30 TABLET | Refills: 2 | Status: SHIPPED | OUTPATIENT
Start: 2019-05-21 | End: 2019-08-12 | Stop reason: SDUPTHER

## 2019-05-29 DIAGNOSIS — E11.9 TYPE 2 DIABETES MELLITUS WITHOUT COMPLICATION, WITH LONG-TERM CURRENT USE OF INSULIN (HCC): ICD-10-CM

## 2019-05-29 DIAGNOSIS — Z79.4 TYPE 2 DIABETES MELLITUS WITHOUT COMPLICATION, WITH LONG-TERM CURRENT USE OF INSULIN (HCC): ICD-10-CM

## 2019-05-30 RX ORDER — INSULIN GLARGINE 300 U/ML
28 INJECTION, SOLUTION SUBCUTANEOUS
Qty: 3 ML | Refills: 1 | Status: SHIPPED | OUTPATIENT
Start: 2019-05-30 | End: 2020-03-16

## 2019-06-05 ENCOUNTER — TELEPHONE (OUTPATIENT)
Dept: NEUROLOGY | Facility: CLINIC | Age: 49
End: 2019-06-05

## 2019-06-05 RX ORDER — BUTALBITAL, ACETAMINOPHEN AND CAFFEINE 50; 325; 40 MG/1; MG/1; MG/1
1 TABLET ORAL ONCE AS NEEDED
Qty: 30 TABLET | Refills: 0 | Status: SHIPPED | OUTPATIENT
Start: 2019-06-05 | End: 2020-03-16

## 2019-06-06 ENCOUNTER — TELEPHONE (OUTPATIENT)
Dept: NEUROLOGY | Facility: CLINIC | Age: 49
End: 2019-06-06

## 2019-06-06 RX ORDER — BUTALBITAL, ACETAMINOPHEN, CAFFEINE AND CODEINE PHOSPHATE 50; 325; 40; 30 MG/1; MG/1; MG/1; MG/1
CAPSULE ORAL
Qty: 90 CAPSULE | Refills: 0 | Status: SHIPPED | OUTPATIENT
Start: 2019-06-06 | End: 2019-09-10 | Stop reason: SDUPTHER

## 2019-06-06 NOTE — TELEPHONE ENCOUNTER
----- Message from Elva Rodas sent at 6/6/2019  8:08 AM EDT -----  Contact: [phone]  Dr. Machado,     Pt called stating the wrong Rx was called in for a refill yesterday. Pt states, if possible, he would like the Butalbital-APAP-Caff-Cod -39-30 MG capsule [[id number]] (Order [id number]) that was called in on 9/17/18.    Pt asked if the order could be written out as up to 2 every 8 hours for insurance to actually cover it, otherwise pt would have to pay $210.    [phone]

## 2019-06-19 DIAGNOSIS — I10 BENIGN ESSENTIAL HYPERTENSION: ICD-10-CM

## 2019-06-19 RX ORDER — LISINOPRIL 40 MG/1
TABLET ORAL
Qty: 30 TABLET | Refills: 1 | Status: SHIPPED | OUTPATIENT
Start: 2019-06-19 | End: 2019-08-12 | Stop reason: SDUPTHER

## 2019-08-12 DIAGNOSIS — I10 BENIGN ESSENTIAL HYPERTENSION: ICD-10-CM

## 2019-08-12 RX ORDER — HYDROCHLOROTHIAZIDE 25 MG/1
TABLET ORAL
Qty: 30 TABLET | Refills: 1 | Status: SHIPPED | OUTPATIENT
Start: 2019-08-12 | End: 2019-10-15 | Stop reason: SDUPTHER

## 2019-08-12 RX ORDER — LISINOPRIL 40 MG/1
TABLET ORAL
Qty: 30 TABLET | Refills: 1 | Status: SHIPPED | OUTPATIENT
Start: 2019-08-12 | End: 2019-10-15 | Stop reason: SDUPTHER

## 2019-09-10 RX ORDER — BUTALBITAL, ACETAMINOPHEN, CAFFEINE AND CODEINE PHOSPHATE 50; 325; 40; 30 MG/1; MG/1; MG/1; MG/1
CAPSULE ORAL
Qty: 90 CAPSULE | Refills: 0 | Status: SHIPPED | OUTPATIENT
Start: 2019-09-10 | End: 2019-12-02 | Stop reason: SDUPTHER

## 2019-10-15 DIAGNOSIS — I10 BENIGN ESSENTIAL HYPERTENSION: ICD-10-CM

## 2019-10-15 RX ORDER — LISINOPRIL 40 MG/1
40 TABLET ORAL DAILY
Qty: 30 TABLET | Refills: 1 | Status: SHIPPED | OUTPATIENT
Start: 2019-10-15 | End: 2019-12-20

## 2019-10-15 RX ORDER — HYDROCHLOROTHIAZIDE 25 MG/1
25 TABLET ORAL DAILY
Qty: 30 TABLET | Refills: 1 | Status: SHIPPED | OUTPATIENT
Start: 2019-10-15 | End: 2019-12-20

## 2019-12-02 RX ORDER — BUTALBITAL, ACETAMINOPHEN, CAFFEINE AND CODEINE PHOSPHATE 50; 325; 40; 30 MG/1; MG/1; MG/1; MG/1
CAPSULE ORAL
Qty: 90 CAPSULE | Refills: 0 | Status: SHIPPED | OUTPATIENT
Start: 2019-12-02 | End: 2020-01-06

## 2019-12-20 ENCOUNTER — TELEPHONE (OUTPATIENT)
Dept: INTERNAL MEDICINE | Facility: CLINIC | Age: 49
End: 2019-12-20

## 2019-12-20 DIAGNOSIS — I10 BENIGN ESSENTIAL HYPERTENSION: ICD-10-CM

## 2019-12-20 RX ORDER — HYDROCHLOROTHIAZIDE 25 MG/1
TABLET ORAL
Qty: 30 TABLET | Refills: 0 | Status: SHIPPED | OUTPATIENT
Start: 2019-12-20 | End: 2020-01-20 | Stop reason: SDUPTHER

## 2019-12-20 RX ORDER — LISINOPRIL 40 MG/1
TABLET ORAL
Qty: 30 TABLET | Refills: 0 | Status: SHIPPED | OUTPATIENT
Start: 2019-12-20 | End: 2020-01-20 | Stop reason: SDUPTHER

## 2019-12-21 NOTE — TELEPHONE ENCOUNTER
PATIENT CALLED STATING THAT THE MEDICATIONS WERE NOT SENT TO HIS PHARMACY. PLEASE RE SEND THE MEDICATIONS TO HIS PHARMACY.

## 2019-12-21 NOTE — TELEPHONE ENCOUNTER
Called the pharmacy and verified that they did get the prescription and will be getting it ready for the patient to  today.

## 2020-01-04 DIAGNOSIS — G44.229 CHRONIC TENSION-TYPE HEADACHE, NOT INTRACTABLE: Primary | ICD-10-CM

## 2020-01-06 RX ORDER — BUTALBITAL, ACETAMINOPHEN, CAFFEINE AND CODEINE PHOSPHATE 50; 325; 40; 30 MG/1; MG/1; MG/1; MG/1
CAPSULE ORAL
Qty: 90 CAPSULE | Refills: 0 | Status: SHIPPED | OUTPATIENT
Start: 2020-01-06 | End: 2020-02-12 | Stop reason: SDUPTHER

## 2020-01-20 DIAGNOSIS — I10 BENIGN ESSENTIAL HYPERTENSION: ICD-10-CM

## 2020-01-20 RX ORDER — LISINOPRIL 40 MG/1
40 TABLET ORAL DAILY
Qty: 30 TABLET | Refills: 0 | Status: SHIPPED | OUTPATIENT
Start: 2020-01-20 | End: 2020-02-19

## 2020-01-20 RX ORDER — HYDROCHLOROTHIAZIDE 25 MG/1
25 TABLET ORAL DAILY
Qty: 30 TABLET | Refills: 0 | Status: SHIPPED | OUTPATIENT
Start: 2020-01-20 | End: 2020-02-19

## 2020-02-12 DIAGNOSIS — G44.229 CHRONIC TENSION-TYPE HEADACHE, NOT INTRACTABLE: ICD-10-CM

## 2020-02-12 RX ORDER — BUTALBITAL, ACETAMINOPHEN, CAFFEINE AND CODEINE PHOSPHATE 50; 325; 40; 30 MG/1; MG/1; MG/1; MG/1
CAPSULE ORAL
Qty: 90 CAPSULE | Refills: 0 | Status: SHIPPED | OUTPATIENT
Start: 2020-02-12 | End: 2020-03-12 | Stop reason: SDUPTHER

## 2020-02-19 DIAGNOSIS — I10 BENIGN ESSENTIAL HYPERTENSION: ICD-10-CM

## 2020-02-19 RX ORDER — HYDROCHLOROTHIAZIDE 25 MG/1
TABLET ORAL
Qty: 30 TABLET | Refills: 0 | Status: SHIPPED | OUTPATIENT
Start: 2020-02-19 | End: 2020-03-17 | Stop reason: SDUPTHER

## 2020-02-19 RX ORDER — LISINOPRIL 40 MG/1
TABLET ORAL
Qty: 30 TABLET | Refills: 0 | Status: SHIPPED | OUTPATIENT
Start: 2020-02-19 | End: 2020-03-17 | Stop reason: SDUPTHER

## 2020-03-12 DIAGNOSIS — G44.229 CHRONIC TENSION-TYPE HEADACHE, NOT INTRACTABLE: ICD-10-CM

## 2020-03-12 RX ORDER — BUTALBITAL, ACETAMINOPHEN, CAFFEINE AND CODEINE PHOSPHATE 50; 325; 40; 30 MG/1; MG/1; MG/1; MG/1
CAPSULE ORAL
Qty: 90 CAPSULE | Refills: 0 | Status: SHIPPED | OUTPATIENT
Start: 2020-03-12 | End: 2020-04-13 | Stop reason: SDUPTHER

## 2020-03-16 ENCOUNTER — OFFICE VISIT (OUTPATIENT)
Dept: INTERNAL MEDICINE | Facility: CLINIC | Age: 50
End: 2020-03-16

## 2020-03-16 VITALS
WEIGHT: 240.4 LBS | OXYGEN SATURATION: 98 % | TEMPERATURE: 97.9 F | SYSTOLIC BLOOD PRESSURE: 140 MMHG | HEART RATE: 111 BPM | DIASTOLIC BLOOD PRESSURE: 88 MMHG | HEIGHT: 70 IN | BODY MASS INDEX: 34.41 KG/M2

## 2020-03-16 DIAGNOSIS — Z13.0 SCREENING FOR ENDOCRINE, METABOLIC AND IMMUNITY DISORDER: ICD-10-CM

## 2020-03-16 DIAGNOSIS — Z13.228 SCREENING FOR ENDOCRINE, METABOLIC AND IMMUNITY DISORDER: ICD-10-CM

## 2020-03-16 DIAGNOSIS — I10 BENIGN ESSENTIAL HYPERTENSION: ICD-10-CM

## 2020-03-16 DIAGNOSIS — E55.9 VITAMIN D DEFICIENCY: ICD-10-CM

## 2020-03-16 DIAGNOSIS — I10 BENIGN ESSENTIAL HYPERTENSION: Primary | ICD-10-CM

## 2020-03-16 DIAGNOSIS — E66.01 MORBID OBESITY (HCC): ICD-10-CM

## 2020-03-16 DIAGNOSIS — Z13.0 SCREENING FOR DISORDER OF BLOOD AND BLOOD-FORMING ORGANS: ICD-10-CM

## 2020-03-16 DIAGNOSIS — R79.89 ABNORMAL TSH: ICD-10-CM

## 2020-03-16 DIAGNOSIS — E78.5 HYPERLIPIDEMIA, UNSPECIFIED HYPERLIPIDEMIA TYPE: ICD-10-CM

## 2020-03-16 DIAGNOSIS — Z00.00 ANNUAL PHYSICAL EXAM: Primary | ICD-10-CM

## 2020-03-16 DIAGNOSIS — Z79.4 TYPE 2 DIABETES MELLITUS WITHOUT COMPLICATION, WITH LONG-TERM CURRENT USE OF INSULIN (HCC): ICD-10-CM

## 2020-03-16 DIAGNOSIS — E11.9 TYPE 2 DIABETES MELLITUS WITHOUT COMPLICATION, WITH LONG-TERM CURRENT USE OF INSULIN (HCC): ICD-10-CM

## 2020-03-16 DIAGNOSIS — E10.9 TYPE 1 DIABETES MELLITUS WITHOUT COMPLICATION (HCC): ICD-10-CM

## 2020-03-16 DIAGNOSIS — Z13.29 SCREENING FOR ENDOCRINE, METABOLIC AND IMMUNITY DISORDER: ICD-10-CM

## 2020-03-16 DIAGNOSIS — G43.009 MIGRAINE WITHOUT AURA AND WITHOUT STATUS MIGRAINOSUS, NOT INTRACTABLE: ICD-10-CM

## 2020-03-16 PROBLEM — IMO0002 UNCONTROLLED TYPE 1 DIABETES MELLITUS: Status: RESOLVED | Noted: 2018-04-08 | Resolved: 2020-03-16

## 2020-03-16 LAB
HBA1C MFR BLD: 7.5 %
POC CREATININE URINE: 300
POC MICROALBUMIN URINE: 10

## 2020-03-16 PROCEDURE — 99396 PREV VISIT EST AGE 40-64: CPT | Performed by: NURSE PRACTITIONER

## 2020-03-16 PROCEDURE — 82044 UR ALBUMIN SEMIQUANTITATIVE: CPT | Performed by: NURSE PRACTITIONER

## 2020-03-16 PROCEDURE — 83036 HEMOGLOBIN GLYCOSYLATED A1C: CPT | Performed by: NURSE PRACTITIONER

## 2020-03-16 RX ORDER — INSULIN ASPART 100 [IU]/ML
25 INJECTION, SUSPENSION SUBCUTANEOUS 2 TIMES DAILY WITH MEALS
COMMUNITY
End: 2021-02-08

## 2020-03-16 NOTE — PROGRESS NOTES
Chief Complaint   Patient presents with   • Annual Exam     est care       Sina Reyez is a 49 y.o. male and is here to establish care at this office and obatin a comprehensive physical exam. He has been seen by SHALINI Herron, at [facility] at [facility] in [place].  The patient reports he has migraines, T1DM, HTN, hyperlipidemia.  Normally eats a low carb, heart healthy diet.  He is not currently physically active other than walking at work, plans to really increase physical activity over the next few months.  Reports he has 1-2 migraines a month, which are not debilitating. Denies chest pain, dyspnea, orthopnea, palpitations, lower extremity edema,  weakness, visual disturbances, confusion as well as foot ulcerations, hyperglycemia, hypoglycemia, nausea, paresthesia of the feet, polydipsia, polyuria, polyphagia, weight loss.      History:  Erectile dysfunction  not currently. States he did have issues with ED when going through a divorce a few years ago.    Nocturia  yes, 2-3 x a week after drinking water past dinner    Do you take any herbs or supplements that were not prescribed by a doctor? no  Are you taking aspirin daily? no    Health Habits:  Dental Exam. up to date  Eye Exam. up to date  Exercise: 5 times/week.  Current exercise activities include: walking, 9911-7719 steps per day    Health Maintenance   Topic Date Due   • HEPATITIS C SCREENING  06/06/2016   • DIABETIC FOOT EXAM  06/06/2016   • ZOSTER VACCINE (1 of 2) 06/25/2019   • LIPID PANEL  08/17/2019   • COLONOSCOPY  05/01/2020 (Originally 6/6/2016)   • HEMOGLOBIN A1C  06/16/2020   • DIABETIC EYE EXAM  03/01/2021   • URINE MICROALBUMIN  03/16/2021   • ANNUAL PHYSICAL  03/17/2021   • TDAP/TD VACCINES (2 - Td) 11/01/2027   • PNEUMOCOCCAL VACCINE (19-64 MEDIUM RISK)  Completed   • INFLUENZA VACCINE  Completed       PMH, PSH, SocHx, FamHx, Allergies, and Medications: Reviewed and updated in the Visit  "Navigator.     No Known Allergies  Past Medical History:   Diagnosis Date   • Acute sinusitis    • Acute stress disorder    • Anxiety    • Diabetes mellitus (CMS/HCC)    • Hypertension    • Migraine    • MRSA infection    • Rib pain on left side    • Toe injury    • Verruca    • Viral gastroenteritis      Past Surgical History:   Procedure Laterality Date   • TONSILLECTOMY       Social History     Socioeconomic History   • Marital status:      Spouse name: Not on file   • Number of children: Not on file   • Years of education: Not on file   • Highest education level: Not on file   Tobacco Use   • Smoking status: Never Smoker   • Smokeless tobacco: Never Used   Substance and Sexual Activity   • Alcohol use: Yes     Comment: 1-5 WEEKLY   • Drug use: No   • Sexual activity: Defer     Family History   Problem Relation Age of Onset   • Transient ischemic attack Mother    • Cancer Mother    • Dementia Mother    • Hypertension Mother    • Stroke Father    • Diabetes Father    • Hypertension Father        Review of Systems  Review of Systems   Constitutional: Negative.    HENT: Negative.    Eyes: Negative.    Respiratory: Negative.    Cardiovascular: Negative.    Gastrointestinal: Negative.    Endocrine: Negative.    Genitourinary: Negative.    Musculoskeletal: Negative for joint swelling, myalgias and neck pain.   Skin: Negative.    Neurological: Negative.    Hematological: Does not bruise/bleed easily.   Psychiatric/Behavioral: Negative.        Objective   /88   Pulse 111   Temp 97.9 °F (36.6 °C) (Temporal)   Ht 177.8 cm (70\")   Wt 109 kg (240 lb 6.4 oz)   SpO2 98%   BMI 34.49 kg/m²     Physical Exam   Constitutional: He is oriented to person, place, and time. He appears well-developed and well-nourished.   HENT:   Head: Normocephalic.   Right Ear: Tympanic membrane, external ear and ear canal normal.   Left Ear: Tympanic membrane, external ear and ear canal normal.   Nose: Nose normal.   Mouth/Throat: " Uvula is midline, oropharynx is clear and moist and mucous membranes are normal.   Eyes: Pupils are equal, round, and reactive to light. Conjunctivae and EOM are normal.   Neck: Normal range of motion and full passive range of motion without pain. Neck supple. Carotid bruit is not present. No thyromegaly present.   Cardiovascular: Normal rate, regular rhythm, normal heart sounds and intact distal pulses.   Pulmonary/Chest: Effort normal and breath sounds normal.   Abdominal: Soft. Normal aorta and bowel sounds are normal. There is no hepatosplenomegaly. There is no tenderness. There is no CVA tenderness.   Musculoskeletal: Normal range of motion. He exhibits no edema, tenderness or deformity.   Lymphadenopathy:     He has no cervical adenopathy.   Neurological: He is alert and oriented to person, place, and time. He has normal strength. No sensory deficit. Coordination and gait normal.   CN II-XII normal   Skin: Skin is warm. Capillary refill takes less than 2 seconds.   Psychiatric: He has a normal mood and affect. His speech is normal and behavior is normal. Thought content normal. Cognition and memory are normal.       The 10-year CVD risk score (D'Agostino, et al., 2008) is: 23.3%    Values used to calculate the score:      Age: 49 years      Sex: Male      Diabetic: Yes      Tobacco smoker: No      Systolic Blood Pressure: 140 mmHg      Is BP treated: Yes      HDL Cholesterol: 46 mg/dL      Total Cholesterol: 198 mg/dL    Assessment/Plan   1. Healthy male exam.    2. Patient Counseling: Including but not Limited to the following, when appropriate:  --Nutrition: Stressed importance of moderation in sodium/caffeine intake, saturated fat and cholesterol, caloric balance, sufficient intake of fresh fruits, vegetables, fiber  .--Discussed the issue of daily use of baby aspirin.  --Exercise: Stressed the importance of regular exercise.   --Substance Abuse: Discussed cessation/primary prevention of tobacco, alcohol,  or other drug use; driving or other dangerous activities under the influence; availability of treatment for abuse, as indicated based on social history.    --Sexuality: Discussed sexually transmitted diseases, partner selection, use of condoms and contraceptive alternatives.   --Injury prevention: Discussed safety belts, safety helmets, smoke detector, smoking near bedding or upholstery.   --Dental health: Discussed importance of regular tooth brushing, flossing, and dental visits.  --Immunizations reviewed.  --Discussed benefits of colon cancer screening.  3. Discussed the patient's BMI with him.  The BMI is above average; BMI management plan is completed  4. Return in about 6 months (around 9/16/2020) for Next scheduled follow up.  5. Age-appropriate Screening Scheduled    Assessment/Plan     Sina was seen today for annual exam.    Diagnoses and all orders for this visit:    Annual physical exam    Benign essential hypertension  -     Comprehensive Metabolic Panel    Hyperlipidemia, unspecified hyperlipidemia type  -     Lipid Panel    Migraine without aura and without status migrainosus, not intractable    Type 1 diabetes mellitus without complication (CMS/HCC)  -     POC Glycosylated Hemoglobin (Hb A1C)  -     POCT microalbumin    Morbid obesity (CMS/HCC)        - Patient's Body mass index is 34.49 kg/m². BMI is above normal parameters. Recommendations include: exercise counseling and nutrition counseling   .  Vitamin D deficiency  -     Vitamin D 25 Hydroxy    Screening for disorder of blood and blood-forming organs  -     CBC & Differential    Screening for endocrine, metabolic and immunity disorder  -     Comprehensive Metabolic Panel  -     TSH  -     T4, Free          - Follow heart healthy diet, low carbohydrate diet.  Advised to reduce daily sodium intake to < 1500 mg per day.  Avoid processed & fast foods.        - Monitor blood pressure as discussed, keep log and bring to next appointment.           - Exercise as tolerated, with a goal of 30-45 minutes of moderate exercise most days of the week.        - Monitor blood sugars as discussed        - See eye doctor annually or as discussed        - Wear protective foot wear/no bare feet        - Check feet regularly for calluses or ulcers        - Discussed risk of poorly controlled diabetes and long-term complications        - Take all medications as prescribed

## 2020-03-17 DIAGNOSIS — I10 BENIGN ESSENTIAL HYPERTENSION: ICD-10-CM

## 2020-03-17 NOTE — TELEPHONE ENCOUNTER
PT CALLED TO REQUEST A REFILL FOR hydroCHLOROthiazide (HYDRODIURIL) 25 MG tablet AND lisinopril (PRINIVIL,ZESTRIL) 40 MG tablet.    PT CONTACT [phone]     PHARMACY VERIFIED MONSTER

## 2020-03-18 RX ORDER — HYDROCHLOROTHIAZIDE 25 MG/1
25 TABLET ORAL DAILY
Qty: 30 TABLET | Refills: 1 | Status: SHIPPED | OUTPATIENT
Start: 2020-03-18 | End: 2020-04-17 | Stop reason: SDUPTHER

## 2020-03-18 RX ORDER — LISINOPRIL 40 MG/1
40 TABLET ORAL DAILY
Qty: 30 TABLET | Refills: 1 | Status: SHIPPED | OUTPATIENT
Start: 2020-03-18 | End: 2020-04-17 | Stop reason: SDUPTHER

## 2020-04-11 DIAGNOSIS — G44.229 CHRONIC TENSION-TYPE HEADACHE, NOT INTRACTABLE: ICD-10-CM

## 2020-04-11 RX ORDER — BUTALBITAL, ACETAMINOPHEN, CAFFEINE AND CODEINE PHOSPHATE 50; 325; 40; 30 MG/1; MG/1; MG/1; MG/1
CAPSULE ORAL
Qty: 90 CAPSULE | Refills: 0 | Status: CANCELLED | OUTPATIENT
Start: 2020-04-11

## 2020-04-13 DIAGNOSIS — G44.229 CHRONIC TENSION-TYPE HEADACHE, NOT INTRACTABLE: ICD-10-CM

## 2020-04-13 RX ORDER — BUTALBITAL, ACETAMINOPHEN, CAFFEINE AND CODEINE PHOSPHATE 50; 325; 40; 30 MG/1; MG/1; MG/1; MG/1
CAPSULE ORAL
Qty: 90 CAPSULE | Refills: 0 | Status: SHIPPED | OUTPATIENT
Start: 2020-04-13 | End: 2020-05-13 | Stop reason: SDUPTHER

## 2020-04-17 DIAGNOSIS — I10 BENIGN ESSENTIAL HYPERTENSION: ICD-10-CM

## 2020-04-17 RX ORDER — HYDROCHLOROTHIAZIDE 25 MG/1
25 TABLET ORAL DAILY
Qty: 90 TABLET | Refills: 0 | Status: SHIPPED | OUTPATIENT
Start: 2020-04-17 | End: 2020-07-16 | Stop reason: SDUPTHER

## 2020-04-17 RX ORDER — LISINOPRIL 40 MG/1
40 TABLET ORAL DAILY
Qty: 30 TABLET | Refills: 1 | Status: SHIPPED | OUTPATIENT
Start: 2020-04-17 | End: 2020-04-17 | Stop reason: SDUPTHER

## 2020-04-17 RX ORDER — LISINOPRIL 40 MG/1
40 TABLET ORAL DAILY
Qty: 90 TABLET | Refills: 0 | Status: SHIPPED | OUTPATIENT
Start: 2020-04-17 | End: 2020-07-16 | Stop reason: SDUPTHER

## 2020-05-13 DIAGNOSIS — G44.229 CHRONIC TENSION-TYPE HEADACHE, NOT INTRACTABLE: ICD-10-CM

## 2020-05-13 RX ORDER — BUTALBITAL, ACETAMINOPHEN, CAFFEINE AND CODEINE PHOSPHATE 50; 325; 40; 30 MG/1; MG/1; MG/1; MG/1
CAPSULE ORAL
Qty: 90 CAPSULE | Refills: 0 | Status: SHIPPED | OUTPATIENT
Start: 2020-05-13 | End: 2020-06-16 | Stop reason: SDUPTHER

## 2020-06-02 RX ORDER — AMITRIPTYLINE HYDROCHLORIDE 10 MG/1
20 TABLET, FILM COATED ORAL NIGHTLY
Qty: 60 TABLET | Refills: 12 | Status: SHIPPED | OUTPATIENT
Start: 2020-06-02 | End: 2021-01-01

## 2020-06-15 DIAGNOSIS — G44.229 CHRONIC TENSION-TYPE HEADACHE, NOT INTRACTABLE: ICD-10-CM

## 2020-06-16 RX ORDER — BUTALBITAL, ACETAMINOPHEN, CAFFEINE AND CODEINE PHOSPHATE 50; 325; 40; 30 MG/1; MG/1; MG/1; MG/1
CAPSULE ORAL
Qty: 90 CAPSULE | Refills: 0 | Status: SHIPPED | OUTPATIENT
Start: 2020-06-16 | End: 2020-07-16 | Stop reason: SDUPTHER

## 2020-06-29 ENCOUNTER — OFFICE VISIT (OUTPATIENT)
Dept: NEUROLOGY | Facility: CLINIC | Age: 50
End: 2020-06-29

## 2020-06-29 VITALS
DIASTOLIC BLOOD PRESSURE: 72 MMHG | BODY MASS INDEX: 34.07 KG/M2 | OXYGEN SATURATION: 98 % | WEIGHT: 238 LBS | SYSTOLIC BLOOD PRESSURE: 118 MMHG | HEIGHT: 70 IN | HEART RATE: 95 BPM

## 2020-06-29 DIAGNOSIS — G44.229 CHRONIC TENSION-TYPE HEADACHE, NOT INTRACTABLE: Primary | ICD-10-CM

## 2020-06-29 PROCEDURE — 99214 OFFICE O/P EST MOD 30 MIN: CPT | Performed by: PSYCHIATRY & NEUROLOGY

## 2020-06-29 NOTE — PROGRESS NOTES
Subjective:    CC: Sina Reyez is in clinic today for follow up for tension type headaches.    HPI:  5/7/2019: He is in clinic for regular follow-up.  Since her last visit, he reports that amitriptyline 20 mg daily has helped significantly keeping the headaches under control.  He is currently having one headache every 3 weeks.  It is very mild and he does not help usually take anything over-the-counter as an abortive treatment.  Is tolerating medication well without any side effects.    6/29/2020: He is in clinic for regular follow-up.  Since his last visit, he reports that with amitriptyline 20 mg at bedtime, the headaches remain under excellent control.  Except for beginning of spring, he had increase frequency of headaches caused by sinus issues otherwise he has done really well.  He takes Fioricet as needed as an abortive treatment it works well.  He denies any side effects with amitriptyline use.      The following portions of the patient's history were reviewed and updated as of 06/29/2020: allergies, social history and problem list.       Current Outpatient Medications:   •  amitriptyline (ELAVIL) 10 MG tablet, Take 2 tablets by mouth Every Night., Disp: 60 tablet, Rfl: 12  •  butalbital-acetaminophen-caffeine-codeine (FIORICET WITH CODEINE) -97-30 MG per capsule, TAKE TWO CAPSULES BY MOUTH EVERY 8 HOURS AS NEEDED FOR HEADACHE, Disp: 90 capsule, Rfl: 0  •  hydroCHLOROthiazide (HYDRODIURIL) 25 MG tablet, Take 1 tablet by mouth Daily., Disp: 90 tablet, Rfl: 0  •  insulin aspart prot-insulin aspart (novoLOG 70/30) (70-30) 100 UNIT/ML injection, Inject 25 Units under the skin into the appropriate area as directed 2 (Two) Times a Day With Meals., Disp: , Rfl:   •  insulin regular (NOVOLIN R RELION) 100 UNIT/ML injection, Inject 10 Units under the skin 3 (Three) Times a Day Before Meals., Disp: 10 mL, Rfl: 12  •  lisinopril (PRINIVIL,ZESTRIL) 40 MG tablet, Take 1 tablet by mouth Daily., Disp: 90 tablet,  "Rfl: 0   Past Medical History:   Diagnosis Date   • Acute sinusitis    • Acute stress disorder    • Anxiety    • Diabetes mellitus (CMS/HCC)    • Hypertension    • Migraine    • MRSA infection    • Rib pain on left side    • Toe injury    • Verruca    • Viral gastroenteritis       Past Surgical History:   Procedure Laterality Date   • TONSILLECTOMY        Family History   Problem Relation Age of Onset   • Transient ischemic attack Mother    • Cancer Mother    • Dementia Mother    • Hypertension Mother    • Stroke Father    • Diabetes Father    • Hypertension Father         Review of Systems   Constitutional: Negative.    HENT: Negative.    Eyes: Negative.    Respiratory: Negative.    Cardiovascular: Negative.    Gastrointestinal: Negative.    Endocrine: Negative.    Genitourinary: Negative.    Musculoskeletal: Negative.    Skin: Negative.    Allergic/Immunologic: Negative.    Neurological: Negative.    Hematological: Negative.    Psychiatric/Behavioral: Negative.      Objective:    /72   Pulse 95   Ht 177.8 cm (70\")   Wt 108 kg (238 lb)   SpO2 98%   BMI 34.15 kg/m²     Neurology Exam:  General apperance: NAD.     Mental status: Alert, awake and oriented to time place and person.    Recent and Remote memory: Can recall 3/3 objects at 5 minutes. Can recall historical events.     Attention span and Concentration: Serial 7s: Normal.     Fund of knowledge:  Normal.     Language and Speech: No aphasia or dysarthria.    Naming , Repitition and Comprehension:  Can name objects, repeat a sentence and follow commands. Speech is clear and fluent with good repetition, comprehension, and naming.    CN II to XII: Intact.    Opthalmoscopic Exam: No papilledema.    Motor:  Right UE muscle strength 5/5. Normal tone.     Left UE muscle strength 5/5. Normal tone.      Right LE muscle strength5/5. Normal tone.     Left LE muscle strength 5/5. Normal tone.      Sensory: Normal light touch, vibration and pinprick sensation " bilaterally.    DTRs: 2+ bilaterally.    Babinski: Negative bilaterally.    Co-ordination: Normal finger-to-nose, heel to shin B/L.    Rhomberg: Negative.    Gait: Normal.    Cardiovascular: Regular rate and rhythm without murmur, gallop or rub.    Assessment and Plan:  1. Chronic tension-type headache, not intractable  -Under excellent control with amitriptyline 20 mg at bedtime.  He reports that he has not had any headache for last 6 weeks.  Usually Fioricet works really well as an abortive treatment so I have advised him to continue using it as needed.  Otherwise, I will see him back in 1 year for follow-up.    I spent 25 minutes face to face with the patient and spent more than 50% of this time  in management, instructions and education regarding above mentioned diagnosis and also on counseling and discussing about taking medication regularly, possible side effects with medication use, importance of good sleep hygiene, good hydration and regular exercise.    Return in about 1 year (around 6/29/2021).

## 2020-07-16 DIAGNOSIS — I10 BENIGN ESSENTIAL HYPERTENSION: ICD-10-CM

## 2020-07-16 DIAGNOSIS — G44.229 CHRONIC TENSION-TYPE HEADACHE, NOT INTRACTABLE: ICD-10-CM

## 2020-07-16 RX ORDER — HYDROCHLOROTHIAZIDE 25 MG/1
25 TABLET ORAL DAILY
Qty: 90 TABLET | Refills: 0 | Status: SHIPPED | OUTPATIENT
Start: 2020-07-16 | End: 2020-10-14 | Stop reason: SDUPTHER

## 2020-07-16 RX ORDER — BUTALBITAL, ACETAMINOPHEN, CAFFEINE AND CODEINE PHOSPHATE 50; 325; 40; 30 MG/1; MG/1; MG/1; MG/1
CAPSULE ORAL
Qty: 90 CAPSULE | Refills: 0 | Status: SHIPPED | OUTPATIENT
Start: 2020-07-16 | End: 2020-08-14 | Stop reason: SDUPTHER

## 2020-07-16 RX ORDER — LISINOPRIL 40 MG/1
40 TABLET ORAL DAILY
Qty: 90 TABLET | Refills: 0 | Status: SHIPPED | OUTPATIENT
Start: 2020-07-16 | End: 2020-10-14 | Stop reason: SDUPTHER

## 2020-08-11 PROCEDURE — U0002 COVID-19 LAB TEST NON-CDC: HCPCS | Performed by: FAMILY MEDICINE

## 2020-08-11 PROCEDURE — U0004 COV-19 TEST NON-CDC HGH THRU: HCPCS | Performed by: FAMILY MEDICINE

## 2020-08-14 DIAGNOSIS — G44.229 CHRONIC TENSION-TYPE HEADACHE, NOT INTRACTABLE: ICD-10-CM

## 2020-08-16 RX ORDER — BUTALBITAL, ACETAMINOPHEN, CAFFEINE AND CODEINE PHOSPHATE 50; 325; 40; 30 MG/1; MG/1; MG/1; MG/1
CAPSULE ORAL
Qty: 90 CAPSULE | Refills: 0 | Status: SHIPPED | OUTPATIENT
Start: 2020-08-16 | End: 2020-09-15 | Stop reason: SDUPTHER

## 2020-09-08 ENCOUNTER — OFFICE VISIT (OUTPATIENT)
Dept: INTERNAL MEDICINE | Facility: CLINIC | Age: 50
End: 2020-09-08

## 2020-09-08 VITALS
BODY MASS INDEX: 33.36 KG/M2 | TEMPERATURE: 98.2 F | HEART RATE: 108 BPM | HEIGHT: 70 IN | SYSTOLIC BLOOD PRESSURE: 140 MMHG | DIASTOLIC BLOOD PRESSURE: 76 MMHG | WEIGHT: 233 LBS | OXYGEN SATURATION: 98 %

## 2020-09-08 DIAGNOSIS — M25.511 ACUTE PAIN OF RIGHT SHOULDER: Primary | ICD-10-CM

## 2020-09-08 DIAGNOSIS — E10.9 TYPE 1 DIABETES MELLITUS WITHOUT COMPLICATION (HCC): ICD-10-CM

## 2020-09-08 DIAGNOSIS — I10 BENIGN ESSENTIAL HYPERTENSION: ICD-10-CM

## 2020-09-08 PROCEDURE — 99214 OFFICE O/P EST MOD 30 MIN: CPT | Performed by: NURSE PRACTITIONER

## 2020-09-08 NOTE — PROGRESS NOTES
"Date: 2020    Name: Sina Reyez  : 1970    Chief Complaint:   Chief Complaint   Patient presents with   • Fall     2 weeks ago and had hurt shoulder on rt side       HPI:  Sina Reyez is a 50 y.o. male presents after a fall 2 weeks ago that has worsened right shoulder pain, noted over the past 3 months. When he landed, after falling, he landed on his right hip and shoulder.  Does not recall injury of shoulder prior to this injury.  Right shoulder is now \"unusually stiff and painful\".  Overhead movement increases pain.  He has been seeing a chiropractor since , has been seen 12-14 times since stiffness initially noted.  Chiropractor diagnosed him with frozen shoulder syndrome. Manipulations and exercises were helpful, prior to fall.  Has been taking ibuprofen, aleve and tylenol without much relief of pain.  Rates pain as 6/10 at this time.  Denies numbness, tingling of right upper extremity or shoulder. He is right hand dominant.     He takes HCTZ 25 mg, lisinopril 40 mg daily as prescribed for hypertension.  Does not monitor blood pressure at home. Denies chest pain, dyspnea, orthopnea, palpitations, lower extremity edema, confusion, headaches, weakness, visual disturbances.  He is also taking insulin 70/30, regular insulin daily as prescribed for diabetes mellitus.  Reports blood glucose has been normal when checked. Patient denies foot ulcerations, hyperglycemia, hypoglycemia, nausea, polydipsia, polyuria, polyphagia, visual disturbances and weight loss.     History:  The following portions of the patient's history were reviewed and updated as appropriate: allergies, current medications, past medical history, family history, surgical history, social history and problem list.     ROS:  Review of Systems   Constitutional: Negative.    Respiratory: Negative for wheezing.    Musculoskeletal: Negative for gait problem, joint swelling, neck pain and neck stiffness.   Neurological: Negative. " "       VS:  Vitals:    09/08/20 1529   BP: 140/76   Pulse: 108   Temp: 98.2 °F (36.8 °C)   TempSrc: Infrared   SpO2: 98%   Weight: 106 kg (233 lb)   Height: 177.8 cm (70\")   PainSc:   8     Body mass index is 33.43 kg/m².    PE:  Physical Exam   Constitutional: He is oriented to person, place, and time. He is obese.  HENT:   Head: Normocephalic.   Right Ear: External ear normal.   Left Ear: External ear normal.   Eyes: Pupils are equal, round, and reactive to light. Conjunctivae are normal.   Neck: Normal range of motion. Neck supple.   Cardiovascular: Normal rate, regular rhythm, normal heart sounds and normal pulses.   Pulmonary/Chest: Effort normal and breath sounds normal.   Musculoskeletal:      Comments: Unable to lift right arm above shoulder height.  Unable to scratch back. Decreased strength in RUE.   Shoulder tender to touch.    Neurological: He is alert and oriented to person, place, and time. No sensory deficit. Coordination and gait normal.   Skin: Skin is warm. Capillary refill takes less than 2 seconds.   Psychiatric: Mood and thought content normal.       Assessment/Plan:  Sina was seen today for fall.    Diagnoses and all orders for this visit:    Acute pain of right shoulder  -     Ambulatory Referral to Orthopedic Surgery  -     diclofenac (VOLTAREN) 50 MG EC tablet; Take 1 tablet by mouth 2 (Two) Times a Day As Needed (pain). Not to be taking with other NSAIDS, take with food.  -     cyclobenzaprine (FLEXERIL) 5 MG tablet; Take 1 tablet by mouth 3 (Three) Times a Day As Needed for Muscle Spasms. May increase to 10 mg, if needed.    Benign essential hypertension        - Follow heart healthy diet.  Advised to reduce daily sodium intake to < 1500 mg per day.  Avoid processed & fast foods.        - Exercise as tolerated, with a goal of 30 minutes of moderate exercise most days, when acute shoulder pain resolved.         - Take medications as prescribed.    Type 1 diabetes mellitus without " complication (CMS/HCC)        - Follow diabetic diet        - See eye doctor annually or as discussed        - Wear protective foot wear/no bare feet        - Check feet regularly for calluses or ulcers        - Discussed risk of poorly controlled diabetes and long-term complications        - Exercise as tolerated for 30-45 minutes most days of the week, when acute pain improved. Gradually increase daily exercise if not currently active.        - Take all medications as prescribed    Return for Next scheduled follow up.

## 2020-09-09 ENCOUNTER — TELEPHONE (OUTPATIENT)
Dept: INTERNAL MEDICINE | Facility: CLINIC | Age: 50
End: 2020-09-09

## 2020-09-09 DIAGNOSIS — M25.511 ACUTE PAIN OF RIGHT SHOULDER: Primary | ICD-10-CM

## 2020-09-09 RX ORDER — CYCLOBENZAPRINE HCL 5 MG
5 TABLET ORAL 3 TIMES DAILY PRN
Qty: 30 TABLET | Refills: 0 | Status: SHIPPED | OUTPATIENT
Start: 2020-09-09 | End: 2020-10-30 | Stop reason: SDUPTHER

## 2020-09-09 RX ORDER — KETOROLAC TROMETHAMINE 30 MG/ML
30 INJECTION, SOLUTION INTRAMUSCULAR; INTRAVENOUS ONCE
Qty: 1 ML | Refills: 0 | Status: SHIPPED | OUTPATIENT
Start: 2020-09-09 | End: 2020-09-09

## 2020-09-09 NOTE — TELEPHONE ENCOUNTER
Please advise patient I have sent in flexeril 5 mg to be taken every 8 hours, as needed.  This is for muscle spasms, will allow his shoulder to relax.  He can continue to take diclofenac as prescribed.  If pain persists, please contact clinic.

## 2020-09-09 NOTE — TELEPHONE ENCOUNTER
Spoke with pt and he said the flexeril was not working. Spoke with Gabriela and she said that she would order a shot for pt to come in and have done tomorrow.

## 2020-09-09 NOTE — TELEPHONE ENCOUNTER
Caller: Sina Reyez    Relationship: Self    Best call back number: [phone]    What medication are you requesting: STRONGER PAIN MEDICATION    What are your current symptoms: PAIN IN RIGHT SHOULDER DOWN TO ELBOW    How long have you been experiencing symptoms: OVER 1 WK    Have you had these symptoms before:    [x] Yes  [] No    Have you been treated for these symptoms before:   [x] Yes  [] No    If a prescription is needed, what is your preferred pharmacy and phone number: [facility] [place]     Additional notes:PT CALLED IN STATED HE IS CURRENTYLY TAKINGING THE RX diclofenac (VOLTAREN) 50 MG EC tablet HE IS CURRENTLY 3 HRS INTO 2ND DOSE AND HAS NOT HAD ANY RELIEF PT IS REQUESTING SOMETHING STRONGER FOR THE PAIN PLEASE ADVISE

## 2020-09-09 NOTE — TELEPHONE ENCOUNTER
Will order toradol injection. Patient to bring a  with him to clinic.  Will order PO toradol after injection.    Patient advised to stop taking meloxicam while taking toradol PO.      Ortho appointment offered to patient tomorrow, 9/10/20.  Patient states he would be unable to schedule an appointment due to previously scheduled activities/appointments.

## 2020-09-10 ENCOUNTER — CLINICAL SUPPORT (OUTPATIENT)
Dept: INTERNAL MEDICINE | Facility: CLINIC | Age: 50
End: 2020-09-10

## 2020-09-10 ENCOUNTER — TELEPHONE (OUTPATIENT)
Dept: INTERNAL MEDICINE | Facility: CLINIC | Age: 50
End: 2020-09-10

## 2020-09-10 DIAGNOSIS — M25.511 ACUTE PAIN OF RIGHT SHOULDER: Primary | ICD-10-CM

## 2020-09-10 PROCEDURE — 96372 THER/PROPH/DIAG INJ SC/IM: CPT | Performed by: PHYSICIAN ASSISTANT

## 2020-09-10 RX ORDER — KETOROLAC TROMETHAMINE 30 MG/ML
30 INJECTION, SOLUTION INTRAMUSCULAR; INTRAVENOUS ONCE
Status: COMPLETED | OUTPATIENT
Start: 2020-09-10 | End: 2020-09-10

## 2020-09-10 RX ORDER — KETOROLAC TROMETHAMINE 10 MG/1
10 TABLET, FILM COATED ORAL EVERY 6 HOURS PRN
Qty: 20 TABLET | Refills: 0 | Status: SHIPPED | OUTPATIENT
Start: 2020-09-10 | End: 2020-09-15

## 2020-09-10 RX ADMIN — KETOROLAC TROMETHAMINE 30 MG: 30 INJECTION, SOLUTION INTRAMUSCULAR; INTRAVENOUS at 10:01

## 2020-09-10 NOTE — TELEPHONE ENCOUNTER
Patient called and would like to know when this will be available for him to . Please call him back once sent.

## 2020-09-10 NOTE — TELEPHONE ENCOUNTER
Spoke with Gabriela and she will call it in after 6 when she has computer access. Spoke with patient also

## 2020-09-10 NOTE — TELEPHONE ENCOUNTER
PT CALLED STATING PHARMACY RECEIVED A TORADOL INJECTION SCRIPT RATHER THAN ORAL    [facility] [address] - [address] AT [facility]. - [phone]  - [phone] FX

## 2020-09-11 ENCOUNTER — TELEPHONE (OUTPATIENT)
Dept: INTERNAL MEDICINE | Facility: CLINIC | Age: 50
End: 2020-09-11

## 2020-09-11 DIAGNOSIS — M25.511 ACUTE PAIN OF RIGHT SHOULDER: Primary | ICD-10-CM

## 2020-09-11 RX ORDER — LIDOCAINE 50 MG/G
1 PATCH TOPICAL EVERY 24 HOURS
Qty: 10 PATCH | Refills: 0 | Status: SHIPPED | OUTPATIENT
Start: 2020-09-11 | End: 2020-10-14

## 2020-09-11 RX ORDER — INDOMETHACIN 75 MG/1
75 CAPSULE, EXTENDED RELEASE ORAL 2 TIMES DAILY WITH MEALS
Qty: 14 CAPSULE | Refills: 0 | Status: SHIPPED | OUTPATIENT
Start: 2020-09-11 | End: 2020-09-15 | Stop reason: ALTCHOICE

## 2020-09-11 NOTE — TELEPHONE ENCOUNTER
PATIENT CALLED REQUESTING AN ALTERNATIVE RX FOR    ketorolac (TORADOL) 10 MG tablet        THIS MEDICATION IS NOT EFFECTIVE    PHARMACY    [facility] [facility] [phone] [facility] [phone]        PATIENT CALL BACK    [phone]

## 2020-09-11 NOTE — TELEPHONE ENCOUNTER
Spoke with pt and he states that he has taken Toradol at 9pm 6am and 12pm also took a flexeril at 9 pm. Has been rotating ice and heat throughout the day. Pt does have a appt with ortho on Tuesday. Wanting to know if something else can be called in?

## 2020-09-11 NOTE — TELEPHONE ENCOUNTER
Please advise patient a prescription for indamethacin 75 mg bid with meals and lidocaine patches.  Indomethacin may reduce the effectiveness of lisinopril for BP, monitor closely.  He should take care to drink plenty of fluids while taking indomethacin.  Stop toradol, diclofenac; no OTC NSAIDS.   We do have a provider on call, and the clinic will be open for appointments tomorrow if pain increases.

## 2020-09-15 ENCOUNTER — OFFICE VISIT (OUTPATIENT)
Dept: ORTHOPEDIC SURGERY | Facility: CLINIC | Age: 50
End: 2020-09-15

## 2020-09-15 VITALS — BODY MASS INDEX: 33.36 KG/M2 | WEIGHT: 233 LBS | RESPIRATION RATE: 18 BRPM | HEIGHT: 70 IN

## 2020-09-15 DIAGNOSIS — S43.401A SPRAIN OF RIGHT SHOULDER, UNSPECIFIED SHOULDER SPRAIN TYPE, INITIAL ENCOUNTER: ICD-10-CM

## 2020-09-15 DIAGNOSIS — G44.229 CHRONIC TENSION-TYPE HEADACHE, NOT INTRACTABLE: ICD-10-CM

## 2020-09-15 DIAGNOSIS — M25.511 ARTHRALGIA OF RIGHT SHOULDER REGION: Primary | ICD-10-CM

## 2020-09-15 DIAGNOSIS — M75.41 IMPINGEMENT SYNDROME OF RIGHT SHOULDER: ICD-10-CM

## 2020-09-15 DIAGNOSIS — M75.01 ADHESIVE CAPSULITIS OF RIGHT SHOULDER: ICD-10-CM

## 2020-09-15 PROCEDURE — 99204 OFFICE O/P NEW MOD 45 MIN: CPT | Performed by: ORTHOPAEDIC SURGERY

## 2020-09-15 RX ORDER — BUTALBITAL, ACETAMINOPHEN, CAFFEINE AND CODEINE PHOSPHATE 50; 325; 40; 30 MG/1; MG/1; MG/1; MG/1
CAPSULE ORAL
Qty: 90 CAPSULE | Refills: 0 | Status: SHIPPED | OUTPATIENT
Start: 2020-09-15 | End: 2020-10-13 | Stop reason: SDUPTHER

## 2020-09-15 RX ORDER — TRAMADOL HYDROCHLORIDE 50 MG/1
50 TABLET ORAL EVERY 6 HOURS PRN
Qty: 30 TABLET | Refills: 0 | Status: SHIPPED | OUTPATIENT
Start: 2020-09-15 | End: 2020-10-08

## 2020-09-15 NOTE — PROGRESS NOTES
Subjective   Patient ID: Sina Reyez is a 50 y.o. male  Pain of the Right Shoulder (Patient is here today for right shoulder pain, he states he sees a chiropractor who thinks something is wrong with the rotator cuff. 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  Denies any neck pain or paresthesias or pain radiating down the arm since the time of his fall.  Most the pain is anterior lateral radiates down toward the elbow at times.  He was given a sling diagnosed by his chiropractor with a probable rotator cuff injury and/or frozen shoulder.  Went to the urgent care where he was given a dose of Toradol which really helped relieve his pain, failed treatment with Indocin and another anti-inflammatory but has been taking amitriptyline for chronic neck pain and headaches along with Flexeril for muscle spasms.      Review of Systems   Constitutional: Negative for fever.   HENT: Negative for dental problem and voice change.    Eyes: Negative for visual disturbance.   Respiratory: Negative for shortness of breath.    Cardiovascular: Negative for chest pain.   Gastrointestinal: Negative for abdominal pain.   Genitourinary: Negative for dysuria.   Musculoskeletal: Positive for arthralgias. Negative for gait problem and joint swelling.   Skin: Negative for rash.   Neurological: Negative for speech difficulty.   Hematological: Does not bruise/bleed easily.   Psychiatric/Behavioral: Negative for confusion.       Past Medical History:   Diagnosis Date   • Acute sinusitis    • Acute stress disorder    • Anxiety    • Diabetes mellitus (CMS/[facility])    • Hypertension    • Migraine    • MRSA infection    • Rib pain on left  side    • Toe injury    • Verruca    • Viral gastroenteritis         Past Surgical History:   Procedure Laterality Date   • TONSILLECTOMY         Family History   Problem Relation Age of Onset   • Transient ischemic attack Mother    • Cancer Mother    • Dementia Mother    • Hypertension Mother    • Stroke Father    • Diabetes Father    • Hypertension Father        Social History     Socioeconomic History   • Marital status:      Spouse name: Not on file   • Number of children: Not on file   • Years of education: Not on file   • Highest education level: Not on file   Tobacco Use   • Smoking status: Never Smoker   • Smokeless tobacco: Never Used   Substance and Sexual Activity   • Alcohol use: Yes     Comment: 1-5 WEEKLY   • Drug use: No   • Sexual activity: Defer       I have reviewed the medical and surgical history, family history, social history, medications, and/or allergies, and the review of systems of this report.    No Known Allergies      Current Outpatient Medications:   •  amitriptyline (ELAVIL) 10 MG tablet, Take 2 tablets by mouth Every Night., Disp: 60 tablet, Rfl: 12  •  butalbital-acetaminophen-caffeine-codeine (FIORICET WITH CODEINE) -57-30 MG per capsule, TAKE TWO CAPSULES BY MOUTH EVERY 8 HOURS AS NEEDED FOR HEADACHE, Disp: 90 capsule, Rfl: 0  •  cyclobenzaprine (FLEXERIL) 5 MG tablet, Take 1 tablet by mouth 3 (Three) Times a Day As Needed for Muscle Spasms. May increase to 10 mg, if needed., Disp: 30 tablet, Rfl: 0  •  hydroCHLOROthiazide (HYDRODIURIL) 25 MG tablet, Take 1 tablet by mouth Daily., Disp: 90 tablet, Rfl: 0  •  insulin aspart prot-insulin aspart (novoLOG 70/30) (70-30) 100 UNIT/ML injection, Inject 25 Units under the skin into the appropriate area as directed 2 (Two) Times a Day With Meals., Disp: , Rfl:   •  insulin regular (NOVOLIN R RELION) 100 UNIT/ML injection, Inject 10 Units under the skin 3 (Three) Times a Day Before Meals., Disp: 10 mL, Rfl: 12  •  ketorolac  "(TORADOL) 10 MG tablet, Take 1 tablet by mouth Every 6 (Six) Hours As Needed for Moderate Pain  for up to 5 days., Disp: 20 tablet, Rfl: 0  •  lidocaine (LIDODERM) 5 %, Place 1 patch on the skin as directed by provider Daily. Remove & Discard patch within 12 hours or as directed by MD, Disp: 10 patch, Rfl: 0  •  lisinopril (PRINIVIL,ZESTRIL) 40 MG tablet, Take 1 tablet by mouth Daily., Disp: 90 tablet, Rfl: 0  •  traMADol (ULTRAM) 50 MG tablet, Take 1 tablet by mouth Every 6 (Six) Hours As Needed for Moderate Pain ., Disp: 30 tablet, Rfl: 0    Objective   Resp 18   Ht 177.8 cm (70\")   Wt 106 kg (233 lb)   BMI 33.43 kg/m²    Physical Exam  Constitutional: Patient is oriented to person, place, and time. Patient appears well-developed and well-nourished.   HENT:Head: Normocephalic and atraumatic.   Eyes: EOM are normal. Pupils are equal, round, and reactive to light.   Neck: Normal range of motion. Neck supple.   Cardiovascular: Normal rate.    Pulmonary/Chest: Effort normal and breath sounds normal.   Abdominal: Soft.   Neurological: Patient is alert and oriented to person, place, and time.   Skin: Skin is warm and dry.   Psychiatric: Patient has a normal mood and affect.   Nursing note and vitals reviewed.       [unfilled]   Right shoulder: Point tenderness over the anterolateral acromial area.  No ecchymosis abrasions contusions no pain with cervical spine range of motion.  Forward active elevation 130, active abduction 120- drop arm test positive impingement sign internal rotation limited to the thumb to the posterior greater trochanteric area.    Assessment/Plan   Review of Radiographic Studies:    Radiographic images today of affected area I personally viewed and showed no sign of acute fracture or dislocation.      Procedures     Sina was seen today for pain.    Diagnoses and all orders for this visit:    Arthralgia of right shoulder region  -     XR Shoulder 2+ View Right  -     traMADol (ULTRAM) 50 MG " tablet; Take 1 tablet by mouth Every 6 (Six) Hours As Needed for Moderate Pain .  -     MRI Shoulder Right Without Contrast  -     Ambulatory Referral to Physical Therapy Evaluate and treat    Adhesive capsulitis of right shoulder    Sprain of right shoulder, unspecified shoulder sprain type, initial encounter    Impingement syndrome of right shoulder       Physical therapy referral given      Recommendations/Plan:   Work/Activity Status: May perform usual activities as tolerated  Discontinue use of sling    Patient agreeable to call or return sooner for any concerns.             Impression:  History of diabetes preexistent adhesive capsulitis with recurrent superimposed sprain rule out rotator cuff tear 2 weeks old  Plan:  Therapy referral, Toradol, MRI right shoulder rule out rotator cuff tear  If positive for rotator cuff tear consider surgical treatment, if negative consider steroid injection but he does have history of diabetes and adverse reactions in the past of steroids

## 2020-10-01 DIAGNOSIS — M25.511 ARTHRALGIA OF RIGHT SHOULDER REGION: ICD-10-CM

## 2020-10-01 RX ORDER — TRAMADOL HYDROCHLORIDE 50 MG/1
50 TABLET ORAL EVERY 6 HOURS PRN
Qty: 30 TABLET | Refills: 0 | Status: CANCELLED | OUTPATIENT
Start: 2020-10-01

## 2020-10-02 DIAGNOSIS — M25.511 ARTHRALGIA OF RIGHT SHOULDER REGION: ICD-10-CM

## 2020-10-05 ENCOUNTER — TELEPHONE (OUTPATIENT)
Dept: ORTHOPEDIC SURGERY | Facility: CLINIC | Age: 50
End: 2020-10-05

## 2020-10-05 NOTE — TELEPHONE ENCOUNTER
I spoke with patient to inform Dr Brand will not refill tramadol, he will follow up after MRI and take OTC antiinflammatories

## 2020-10-06 RX ORDER — TRAMADOL HYDROCHLORIDE 50 MG/1
TABLET ORAL
Qty: 30 TABLET | Refills: 0 | OUTPATIENT
Start: 2020-10-06

## 2020-10-07 ENCOUNTER — HOSPITAL ENCOUNTER (OUTPATIENT)
Dept: MRI IMAGING | Facility: HOSPITAL | Age: 50
Discharge: HOME | End: 2020-10-07
Admitting: ORTHOPAEDIC SURGERY

## 2020-10-07 PROCEDURE — 73221 MRI JOINT UPR EXTREM W/O DYE: CPT

## 2020-10-08 ENCOUNTER — OFFICE VISIT (OUTPATIENT)
Dept: ORTHOPEDIC SURGERY | Facility: CLINIC | Age: 50
End: 2020-10-08

## 2020-10-08 VITALS — HEIGHT: 70 IN | WEIGHT: 233 LBS | RESPIRATION RATE: 18 BRPM | BODY MASS INDEX: 33.36 KG/M2

## 2020-10-08 DIAGNOSIS — M24.111 LABRAL TEAR OF SHOULDER, DEGENERATIVE, RIGHT: ICD-10-CM

## 2020-10-08 DIAGNOSIS — M25.511 ARTHRALGIA OF RIGHT SHOULDER REGION: Primary | ICD-10-CM

## 2020-10-08 DIAGNOSIS — M75.01 ADHESIVE CAPSULITIS OF RIGHT SHOULDER: ICD-10-CM

## 2020-10-08 PROCEDURE — 99213 OFFICE O/P EST LOW 20 MIN: CPT | Performed by: ORTHOPAEDIC SURGERY

## 2020-10-08 RX ORDER — TRAMADOL HYDROCHLORIDE 50 MG/1
50 TABLET ORAL EVERY 6 HOURS PRN
Qty: 20 TABLET | Refills: 0 | Status: SHIPPED | OUTPATIENT
Start: 2020-10-08 | End: 2021-02-08

## 2020-10-08 NOTE — PROGRESS NOTES
Subjective   Patient ID: Sina Reyez is a 50 y.o. male  Results and Follow-up of the Right Shoulder (Go over MRI results. He states the pain in his shoulder has worsened since his last appointment. )             History of Present Illness  Right-hand-dominant male/continued right shoulder pain difficulty laying on his side to get relief when he took a Toradol medication, recent MRI did confirm large labral tear SLAP type tear with no discrete evidence of rotator cuff tearing.  Pain is anteriorly laterally at the right shoulder especially with lifting reaching and lying on his right side.      Review of Systems   Constitutional: Negative for fever.   HENT: Negative for dental problem and voice change.    Eyes: Negative for visual disturbance.   Respiratory: Negative for shortness of breath.    Cardiovascular: Negative for chest pain.   Gastrointestinal: Negative for abdominal pain.   Genitourinary: Negative for dysuria.   Musculoskeletal: Positive for arthralgias. Negative for gait problem and joint swelling.   Skin: Negative for rash.   Neurological: Negative for speech difficulty.   Hematological: Does not bruise/bleed easily.   Psychiatric/Behavioral: Negative for confusion.       Past Medical History:   Diagnosis Date   • Acute sinusitis    • Acute stress disorder    • Anxiety    • Diabetes mellitus (CMS/HCC)    • Hypertension    • Migraine    • MRSA infection    • Rib pain on left side    • Toe injury    • Verruca    • Viral gastroenteritis         Past Surgical History:   Procedure Laterality Date   • TONSILLECTOMY         Family History   Problem Relation Age of Onset   • Transient ischemic attack Mother    • Cancer Mother    • Dementia Mother    • Hypertension Mother    • Stroke Father    • Diabetes Father    • Hypertension Father        Social History     Socioeconomic History   • Marital status:      Spouse name: Not on file   • Number of children: Not on file   • Years of education: Not on  "file   • Highest education level: Not on file   Tobacco Use   • Smoking status: Never Smoker   • Smokeless tobacco: Never Used   Substance and Sexual Activity   • Alcohol use: Yes     Comment: 1-5 WEEKLY   • Drug use: No   • Sexual activity: Defer       I have reviewed the medical and surgical history, family history, social history, medications, and/or allergies, and the review of systems of this report.    No Known Allergies      Current Outpatient Medications:   •  amitriptyline (ELAVIL) 10 MG tablet, Take 2 tablets by mouth Every Night., Disp: 60 tablet, Rfl: 12  •  butalbital-acetaminophen-caffeine-codeine (FIORICET WITH CODEINE) -10-30 MG per capsule, TAKE TWO CAPSULES BY MOUTH EVERY 8 HOURS AS NEEDED FOR HEADACHE, Disp: 90 capsule, Rfl: 0  •  cyclobenzaprine (FLEXERIL) 5 MG tablet, Take 1 tablet by mouth 3 (Three) Times a Day As Needed for Muscle Spasms. May increase to 10 mg, if needed., Disp: 30 tablet, Rfl: 0  •  hydroCHLOROthiazide (HYDRODIURIL) 25 MG tablet, Take 1 tablet by mouth Daily., Disp: 90 tablet, Rfl: 0  •  insulin aspart prot-insulin aspart (novoLOG 70/30) (70-30) 100 UNIT/ML injection, Inject 25 Units under the skin into the appropriate area as directed 2 (Two) Times a Day With Meals., Disp: , Rfl:   •  insulin regular (NOVOLIN R RELION) 100 UNIT/ML injection, Inject 10 Units under the skin 3 (Three) Times a Day Before Meals., Disp: 10 mL, Rfl: 12  •  lisinopril (PRINIVIL,ZESTRIL) 40 MG tablet, Take 1 tablet by mouth Daily., Disp: 90 tablet, Rfl: 0  •  traMADol (ULTRAM) 50 MG tablet, Take 1 tablet by mouth Every 6 (Six) Hours As Needed for Moderate Pain ., Disp: 30 tablet, Rfl: 0  •  lidocaine (LIDODERM) 5 %, Place 1 patch on the skin as directed by provider Daily. Remove & Discard patch within 12 hours or as directed by MD, Disp: 10 patch, Rfl: 0    Objective   Resp 18   Ht 177.8 cm (70\")   Wt 106 kg (233 lb)   BMI 33.43 kg/m²    Physical Exam  Constitutional: Patient is oriented to " person, place, and time. Patient appears well-developed and well-nourished.   HENT:Head: Normocephalic and atraumatic.   Eyes: EOM are normal. Pupils are equal, round, and reactive to light.   Neck: Normal range of motion. Neck supple.   Cardiovascular: Normal rate.    Pulmonary/Chest: Effort normal and breath sounds normal.   Abdominal: Soft.   Neurological: Patient is alert and oriented to person, place, and time.   Skin: Skin is warm and dry.   Psychiatric: Patient has a normal mood and affect.   Nursing note and vitals reviewed.       [unfilled]   Right shoulder: Point tenderness over the proximal biceps tendon, forward elevation limited 90 degrees passive external rotation limited to 20 degrees right arm is neurovascularly intact    Assessment/Plan   Review of Radiographic Studies:    No new imaging done today.  MR study confirms bicipital tendinitis superior labral tear with detachment osteoarthritic change no sign of discrete rotator cuff tear type II acromial morphology.      Procedures     Sina was seen today for results and follow-up.    Diagnoses and all orders for this visit:    Arthralgia of right shoulder region    Labral tear of shoulder, degenerative, right    Adhesive capsulitis of right shoulder       Physical therapy referral given      Recommendations/Plan:   Work/Activity Status: Light duty    Patient agreeable to call or return sooner for any concerns.         Reviewed his MRI with him and using images and diagrams explained the nature of the pathological tearing of the labrum with options of treatment including nonsurgical surgical treatment.  Given his continued symptoms I recommend evaluation with Dr. Tremayne Waters in [place] for reconstruction alternatives labral repair and/or biceps tenodesis and/or other procedures as indicated.  Impression:  Right dominant shoulder history of adhesive capsulitis with superior labral tear acute  Plan:  Refer to Tremayne Waters MD in [place] for  further treatment, he will continue with his home exercise foundation therapy icing use of tramadol and return to see me after his evaluation with Dr. Waters is complete as needed.

## 2020-10-13 DIAGNOSIS — G44.229 CHRONIC TENSION-TYPE HEADACHE, NOT INTRACTABLE: ICD-10-CM

## 2020-10-13 RX ORDER — BUTALBITAL, ACETAMINOPHEN, CAFFEINE AND CODEINE PHOSPHATE 50; 325; 40; 30 MG/1; MG/1; MG/1; MG/1
CAPSULE ORAL
Qty: 90 CAPSULE | Refills: 1 | Status: SHIPPED | OUTPATIENT
Start: 2020-10-13 | End: 2020-12-12 | Stop reason: SDUPTHER

## 2020-10-14 ENCOUNTER — OFFICE VISIT (OUTPATIENT)
Dept: ORTHOPEDIC SURGERY | Facility: CLINIC | Age: 50
End: 2020-10-14

## 2020-10-14 VITALS — HEIGHT: 70 IN | BODY MASS INDEX: 33.46 KG/M2 | OXYGEN SATURATION: 98 % | WEIGHT: 233.69 LBS | HEART RATE: 110 BPM

## 2020-10-14 DIAGNOSIS — S43.431A SUPERIOR GLENOID LABRUM LESION OF RIGHT SHOULDER, INITIAL ENCOUNTER: Primary | ICD-10-CM

## 2020-10-14 DIAGNOSIS — I10 BENIGN ESSENTIAL HYPERTENSION: ICD-10-CM

## 2020-10-14 PROCEDURE — 99204 OFFICE O/P NEW MOD 45 MIN: CPT | Performed by: ORTHOPAEDIC SURGERY

## 2020-10-14 RX ORDER — HYDROCHLOROTHIAZIDE 25 MG/1
25 TABLET ORAL DAILY
Qty: 30 TABLET | Refills: 0 | Status: SHIPPED | OUTPATIENT
Start: 2020-10-14 | End: 2020-12-14 | Stop reason: SDUPTHER

## 2020-10-14 RX ORDER — LISINOPRIL 40 MG/1
40 TABLET ORAL DAILY
Qty: 30 TABLET | Refills: 0 | Status: SHIPPED | OUTPATIENT
Start: 2020-10-14 | End: 2020-11-17 | Stop reason: SDUPTHER

## 2020-10-14 RX ORDER — AMITRIPTYLINE HYDROCHLORIDE 10 MG/1
20 TABLET, FILM COATED ORAL NIGHTLY
Qty: 60 TABLET | Refills: 12 | Status: CANCELLED | OUTPATIENT
Start: 2020-10-14

## 2020-10-14 NOTE — PROGRESS NOTES
[facility]    Subjective     Chief Complaint   Patient presents with   • Right Shoulder - Pain        HPI  Sina Reyez is a 50 y.o. male who presents with new problem of: right shoulder pain.  Onset: mechanical fall. The issue has been ongoing for 2 month(s). Pain is a 7/10 on the pain scale. Pain is described as aching, stabbing and shooting. Associated symptoms include pain and stiffness. The pain is worse with standing, sitting, sleeping, working and leisure; ice and pain medication and/or NSAID improve the pain. Previous treatments have included: NSAIDS.    I have reviewed the following portions of the patient's history:History of Present Illnessand review of systems.    He works in sales.  He has tried Toradol over-the-counter anti-inflammatories and therapy exercises.  He quit doing therapy exercises because of the pain.  He was referred here after the MRI October 7.      Past Medical History:   Diagnosis Date   • Acute sinusitis    • Acute stress disorder    • Anxiety    • Diabetes mellitus (CMS/HCC)    • Hypertension    • Migraine    • MRSA infection    • Rib pain on left side    • Toe injury    • Verruca    • Viral gastroenteritis       Past Surgical History:   Procedure Laterality Date   • TONSILLECTOMY        Family History   Problem Relation Age of Onset   • Transient ischemic attack Mother    • Cancer Mother    • Dementia Mother    • Hypertension Mother    • Stroke Father    • Diabetes Father    • Hypertension Father      Social History     Socioeconomic History   • Marital status:      Spouse name: Not on file   • Number of children: Not on file   • Years of education: Not on file   • Highest education level: Not on file   Tobacco Use   • Smoking status: Never Smoker   • Smokeless tobacco: Never Used   Substance and Sexual Activity   • Alcohol use: Yes     Comment: 1-5 WEEKLY   • Drug use: No   • Sexual activity: Defer      Current Outpatient Medications on  File Prior to Visit   Medication Sig Dispense Refill   • amitriptyline (ELAVIL) 10 MG tablet Take 2 tablets by mouth Every Night. 60 tablet 12   • butalbital-acetaminophen-caffeine-codeine (FIORICET WITH CODEINE) -03-30 MG per capsule TAKE TWO CAPSULES BY MOUTH EVERY 8 HOURS AS NEEDED FOR HEADACHE 90 capsule 1   • cyclobenzaprine (FLEXERIL) 5 MG tablet Take 1 tablet by mouth 3 (Three) Times a Day As Needed for Muscle Spasms. May increase to 10 mg, if needed. 30 tablet 0   • insulin aspart prot-insulin aspart (novoLOG 70/30) (70-30) 100 UNIT/ML injection Inject 25 Units under the skin into the appropriate area as directed 2 (Two) Times a Day With Meals.     • insulin regular (NOVOLIN R RELION) 100 UNIT/ML injection Inject 10 Units under the skin 3 (Three) Times a Day Before Meals. 10 mL 12   • traMADol (ULTRAM) 50 MG tablet Take 1 tablet by mouth Every 6 (Six) Hours As Needed for Moderate Pain . 20 tablet 0   • [DISCONTINUED] hydroCHLOROthiazide (HYDRODIURIL) 25 MG tablet Take 1 tablet by mouth Daily. 90 tablet 0   • [DISCONTINUED] lidocaine (LIDODERM) 5 % Place 1 patch on the skin as directed by provider Daily. Remove & Discard patch within 12 hours or as directed by MD 10 patch 0   • [DISCONTINUED] lisinopril (PRINIVIL,ZESTRIL) 40 MG tablet Take 1 tablet by mouth Daily. 90 tablet 0     No current facility-administered medications on file prior to visit.       No Known Allergies     The following portions of the patient's history were reviewed and updated as appropriate: allergies, current medications, past family history, past medical history, past social history, past surgical history and problem list.    Review of Systems   Constitutional: Negative.    HENT: Negative.    Eyes: Negative.    Respiratory: Negative.    Cardiovascular: Negative.    Gastrointestinal: Negative.    Endocrine: Negative.    Genitourinary: Negative.    Musculoskeletal: Positive for arthralgias.   Skin: Negative.   "  Allergic/Immunologic: Negative.    Neurological: Negative.    Hematological: Negative.    Psychiatric/Behavioral: Negative.         Objective      Physical Exam  Pulse 110   Ht 177.8 cm (70\")   Wt 106 kg (233 lb 11 oz)   SpO2 98%   BMI 33.53 kg/m²     Body mass index is 33.53 kg/m².    GENERAL APPEARANCE: awake, alert & oriented x 3, in no acute distress and well developed, well nourished  PSYCH: normal mood and affect  LUNGS:  breathing nonlabored, no wheezing  EYES: sclera anicteric, pupils equal  CARDIOVASCULAR: palpable pulses. Capillary refill less than 2 seconds  INTEGUMENTARY: skin intact, no clubbing, cyanosis  NEUROLOGIC:  Normal Sensation and reflexes       Ortho Exam  Musculoskeletal   Upper Extremity   Right Shoulder     Inspection and Palpation:     Tenderness - none    Crepitus - none    Sensation is normal    Examination reveals no ecchymosis.      Strength and Tone:    Supraspinatus,  Infraspinatus - 4/5    Subscapularis - 5/5    Deltoid - 5/5  Range of Motion   Left shoulder:    Internal Rotation: ROM - T7    External Rotation: AROM - 80 degrees    Elevation through flexion: AROM - 180 degrees    Right Shoulder:    Internal Rotation: ROM - T7    External Rotation: AROM - 80 degrees    Elevation through flexion: AROM - 90 degrees     Abduction -90 degrees  Instability   Right shoulder    Sulcus sign negative    Apprehension test negative    Alyse relocation test negative    Jerk test negative   Impingement   Right shoulder    Green impingement test positive    Neer impingement test positive   Functional Testing   Right shoulder    AC crossover adduction test negative    Abdominal compression test negative    Lift-off sign negative    Speed's test negative    Mendoza's test positive    Horriblower's sign negative       Imaging/Studies  Imaging Results (Last 7 Days)     ** No results found for the last 168 hours. **      I viewed the x-rays from September 5 which are negative.  I viewed the MRI " from October 7 which shows a large SLAP tear.    Assessment/Plan        ICD-10-CM ICD-9-CM   1. Superior glenoid labrum lesion of right shoulder, initial encounter  S43.431A 840.7     I recommend right shoulder arthroscopy with biceps tenodesis.  Treatment options and alternatives were discussed with patient.  Surgical risks include but are not limited to pain, bleeding, infection, failure to relieve symptoms, need for further procedures, recurrence of symptoms, damage to healthy adjacent structures, hardware loosening/failure, stiffness, weakness, scar, blood clots/DVT/PE, loss of limb or life. We also discussed the postoperative protocol and expected outcome although no guarantees are possible with surgery. All questions were answered; the patient would like to proceed with surgical intervention.    Medical Decision Making  Management Options : over-the-counter medicine and major surgery with risk factors  Data/Risk: radiology tests and independent visualization of imaging, lab tests, or EMG/NCV    Tremayne Waters MD  10/14/20  14:15 EDT         EMR Dragon/Transcription disclaimer:  Much of this encounter note is an electronic transcription of spoken language to printed text. Electronic transcription of spoken language may permit erroneous, or at times, nonsensical words or phrases to be inadvertently transcribed. Although I have reviewed the note for such errors, some may still exist.

## 2020-10-19 ENCOUNTER — TELEPHONE (OUTPATIENT)
Dept: ORTHOPEDIC SURGERY | Facility: CLINIC | Age: 50
End: 2020-10-19

## 2020-10-19 NOTE — TELEPHONE ENCOUNTER
PATIENT HAS AN UPCOMING SURGERY WITH DR. LO IN November. HE IS STILL EXPERIENCING PAIN AND IS WONDERING WHAT HE CAN DO UNTIL THEN. HE WAS GIVEN TRAMADOL BY HIS PREVIOUS DOCTOR, HOWEVER, SINCE DR. LO IS NOT TREATING HIM FOR THIS SHOULDER THE OTHER DOCTOR IS NOW LEAVING THE PAIN MANAGEMENT TO DR. LO SINCE HE IS CURRENTLY SEEING HIM PER PATIENT. HE CAN BE REACHED [phone].

## 2020-10-19 NOTE — TELEPHONE ENCOUNTER
Spoke with pt and explained the reasoning for not prescribing narcotics prior to sx, which he understood. Advised him to alternate between tylenol and ibuprofen, as well as, ice and rest as much as possible; He understood. He will call back if any further questions or concerns.    Mariposa

## 2020-10-24 LAB
25(OH)D3+25(OH)D2 SERPL-MCNC: 35 NG/ML (ref 30–100)
ALBUMIN SERPL-MCNC: 4.6 G/DL (ref 3.5–5.2)
ALBUMIN/GLOB SERPL: 2.1 G/DL
ALP SERPL-CCNC: 89 U/L (ref 39–117)
ALT SERPL-CCNC: 23 U/L (ref 1–41)
AST SERPL-CCNC: 24 U/L (ref 1–40)
BASOPHILS # BLD AUTO: 0.14 10*3/MM3 (ref 0–0.2)
BASOPHILS NFR BLD AUTO: 2 % (ref 0–1.5)
BILIRUB SERPL-MCNC: 0.2 MG/DL (ref 0–1.2)
BUN SERPL-MCNC: 29 MG/DL (ref 6–20)
BUN/CREAT SERPL: 23.8 (ref 7–25)
CALCIUM SERPL-MCNC: 10.1 MG/DL (ref 8.6–10.5)
CHLORIDE SERPL-SCNC: 98 MMOL/L (ref 98–107)
CHOLEST SERPL-MCNC: 197 MG/DL (ref 0–200)
CO2 SERPL-SCNC: 29.4 MMOL/L (ref 22–29)
CREAT SERPL-MCNC: 1.22 MG/DL (ref 0.76–1.27)
EOSINOPHIL # BLD AUTO: 0.37 10*3/MM3 (ref 0–0.4)
EOSINOPHIL NFR BLD AUTO: 5.3 % (ref 0.3–6.2)
ERYTHROCYTE [DISTWIDTH] IN BLOOD BY AUTOMATED COUNT: 12.2 % (ref 12.3–15.4)
GLOBULIN SER CALC-MCNC: 2.2 GM/DL
GLUCOSE SERPL-MCNC: 110 MG/DL (ref 65–99)
HBA1C MFR BLD: 6.4 % (ref 4.8–5.6)
HCT VFR BLD AUTO: 43.6 % (ref 37.5–51)
HDLC SERPL-MCNC: 54 MG/DL (ref 40–60)
HGB BLD-MCNC: 15 G/DL (ref 13–17.7)
IMM GRANULOCYTES # BLD AUTO: 0.02 10*3/MM3 (ref 0–0.05)
IMM GRANULOCYTES NFR BLD AUTO: 0.3 % (ref 0–0.5)
LDLC SERPL CALC-MCNC: 114 MG/DL (ref 0–100)
LYMPHOCYTES # BLD AUTO: 2.86 10*3/MM3 (ref 0.7–3.1)
LYMPHOCYTES NFR BLD AUTO: 40.7 % (ref 19.6–45.3)
MCH RBC QN AUTO: 30.7 PG (ref 26.6–33)
MCHC RBC AUTO-ENTMCNC: 34.4 G/DL (ref 31.5–35.7)
MCV RBC AUTO: 89.3 FL (ref 79–97)
MONOCYTES # BLD AUTO: 0.64 10*3/MM3 (ref 0.1–0.9)
MONOCYTES NFR BLD AUTO: 9.1 % (ref 5–12)
NEUTROPHILS # BLD AUTO: 3 10*3/MM3 (ref 1.7–7)
NEUTROPHILS NFR BLD AUTO: 42.6 % (ref 42.7–76)
NRBC BLD AUTO-RTO: 0.1 /100 WBC (ref 0–0.2)
PLATELET # BLD AUTO: 374 10*3/MM3 (ref 140–450)
POTASSIUM SERPL-SCNC: 5.1 MMOL/L (ref 3.5–5.2)
PROT SERPL-MCNC: 6.8 G/DL (ref 6–8.5)
RBC # BLD AUTO: 4.88 10*6/MM3 (ref 4.14–5.8)
SODIUM SERPL-SCNC: 136 MMOL/L (ref 136–145)
T4 FREE SERPL-MCNC: 1 NG/DL (ref 0.93–1.7)
TRIGL SERPL-MCNC: 164 MG/DL (ref 0–150)
TSH SERPL DL<=0.005 MIU/L-ACNC: 7.61 UIU/ML (ref 0.27–4.2)
VLDLC SERPL CALC-MCNC: 29 MG/DL (ref 5–40)
WBC # BLD AUTO: 7.03 10*3/MM3 (ref 3.4–10.8)

## 2020-10-26 ENCOUNTER — TELEPHONE (OUTPATIENT)
Dept: ORTHOPEDIC SURGERY | Facility: CLINIC | Age: 50
End: 2020-10-26

## 2020-10-26 NOTE — TELEPHONE ENCOUNTER
----- Message from Sina Reyez sent at 10/25/2020 12:33 PM EDT -----  Regarding: Prescription Question  Contact: [phone]  Good afternoon,     Will I be supplied with a sleep aid or something to help relax after the operation? I ordered a wedge pillow in anticipation since I don’t have a recliner. Pain is increasing as we get closer to the surgery date and I haven’t had a good night's rest in 2-3 weeks and realize it’s going to be even more challenging post-surgery.     Thanks so much. None of these questions occurred to me at the time of my appt.     Alton Reyez

## 2020-10-26 NOTE — TELEPHONE ENCOUNTER
He will be prescribed a narcotic and nausea medicine post surgery.  These should help with sleep.     From Dr. Waters    I responded to patient via BIXIt.    Whit Robles

## 2020-10-30 ENCOUNTER — OFFICE VISIT (OUTPATIENT)
Dept: INTERNAL MEDICINE | Facility: CLINIC | Age: 50
End: 2020-10-30

## 2020-10-30 VITALS
HEIGHT: 70 IN | HEART RATE: 95 BPM | SYSTOLIC BLOOD PRESSURE: 128 MMHG | WEIGHT: 235 LBS | TEMPERATURE: 96.6 F | OXYGEN SATURATION: 98 % | DIASTOLIC BLOOD PRESSURE: 72 MMHG | BODY MASS INDEX: 33.64 KG/M2

## 2020-10-30 DIAGNOSIS — E03.9 HYPOTHYROIDISM, UNSPECIFIED TYPE: Primary | ICD-10-CM

## 2020-10-30 DIAGNOSIS — I10 BENIGN ESSENTIAL HYPERTENSION: ICD-10-CM

## 2020-10-30 DIAGNOSIS — M24.111 LABRAL TEAR OF SHOULDER, DEGENERATIVE, RIGHT: ICD-10-CM

## 2020-10-30 DIAGNOSIS — M25.511 ACUTE PAIN OF RIGHT SHOULDER: ICD-10-CM

## 2020-10-30 DIAGNOSIS — E10.9 TYPE 1 DIABETES MELLITUS WITHOUT COMPLICATION (HCC): ICD-10-CM

## 2020-10-30 DIAGNOSIS — E78.5 HYPERLIPIDEMIA, UNSPECIFIED HYPERLIPIDEMIA TYPE: ICD-10-CM

## 2020-10-30 PROCEDURE — 99214 OFFICE O/P EST MOD 30 MIN: CPT | Performed by: NURSE PRACTITIONER

## 2020-10-30 RX ORDER — LEVOTHYROXINE SODIUM 0.05 MG/1
50 TABLET ORAL DAILY
Qty: 30 TABLET | Refills: 2 | Status: SHIPPED | OUTPATIENT
Start: 2020-10-30 | End: 2020-12-29

## 2020-10-30 RX ORDER — CYCLOBENZAPRINE HCL 5 MG
5 TABLET ORAL 3 TIMES DAILY PRN
Qty: 90 TABLET | Refills: 0 | Status: SHIPPED | OUTPATIENT
Start: 2020-10-30 | End: 2020-12-12 | Stop reason: SDUPTHER

## 2020-10-30 NOTE — PROGRESS NOTES
"Date: 10/30/2020    Name: Sina Reyez  : 1970    Chief Complaint:   Chief Complaint   Patient presents with   • Follow-up     on labs       HPI:  Sina Reyez is a 50 y.o. male presents for follow up of labwork drawn on 10/23/20.  TSH continues to be elevated.  Admits he has been feeling more tired than normal, has been unable to lose weight.  Denies temperature intolerance, hair/skin/nail changes, bowel habit changes, palpitations, anxiety, sore throat, hoarseness.   He is currently taking HCTZ 25 mg, lisinopril 40 mg daily for hypertension.  Does not monitor blood pressure at home. Denies chest pain, dyspnea, orthopnea, palpitations, lower extremity edema, confusion, headaches, weakness, visual disturbances.  Continues to take Novolin R, Novolin 70/30 as prescribed for diabetes.  Blood glucose has been normal, per patient. Patient denies foot ulcerations, hyperglycemia, hypoglycemia, nausea, paresthesia of the feet, polydipsia, polyuria, polyphagia, visual disturbances and weight loss.   Continues to have right shoulder pain.  Is scheduled to have Right shoulder arthroscopy with biceps tenodesis on 2020.  Reports cyclobenzaprine 5 mg works better than pain medication, requesting refill.    History: The following portions of the patient's history were reviewed and updated as appropriate: allergies, current medications, past medical history, family history, surgical history, social history and problem list.      ROS:  Review of Systems   Constitutional: Negative for activity change and appetite change.   Respiratory: Negative for wheezing.    Neurological: Negative for dizziness.       VS:  Vitals:    10/30/20 0934   BP: 128/72   Pulse: 95   Temp: 96.6 °F (35.9 °C)   TempSrc: Infrared   SpO2: 98%   Weight: 107 kg (235 lb)   Height: 177.8 cm (70\")     Body mass index is 33.72 kg/m².  PE:  Physical Exam  Constitutional:       Appearance: He is obese. He is not ill-appearing.   HENT:      Head: " Normocephalic.      Right Ear: External ear normal.      Left Ear: External ear normal.   Eyes:      Conjunctiva/sclera: Conjunctivae normal.      Pupils: Pupils are equal, round, and reactive to light.   Neck:      Musculoskeletal: Normal range of motion and neck supple.   Cardiovascular:      Rate and Rhythm: Normal rate and regular rhythm.      Pulses: Normal pulses.      Heart sounds: Normal heart sounds.   Pulmonary:      Effort: Pulmonary effort is normal.      Breath sounds: Normal breath sounds.   Skin:     General: Skin is warm.      Capillary Refill: Capillary refill takes less than 2 seconds.   Neurological:      Mental Status: He is alert and oriented to person, place, and time.      Coordination: Coordination normal.      Gait: Gait normal.   Psychiatric:         Mood and Affect: Mood normal.         Behavior: Behavior normal.         Thought Content: Thought content normal.         Assessment/Plan:  Diagnoses and all orders for this visit:    1. Hypothyroidism, unspecified type (Primary)  -     levothyroxine (Synthroid) 50 MCG tablet; Take 1 tablet by mouth Daily.  Dispense: 30 tablet; Refill: 2.  Advised regarding for administration, to be taken at approximately the same time every day, 30 minutes prior to other medications and food.  Patient verbalizes understanding.    2. Benign essential hypertension  3. Hyperlipidemia, unspecified hyperlipidemia type        - Follow heart healthy diet.  Advised to reduce daily sodium intake to < 1500 mg per day.  Avoid processed & fast foods.        - Monitor blood pressure as discussed, keep log and bring to next appointment.          - Exercise as tolerated, with a goal of 30 minutes of moderate exercise most days.         - Take medications as prescribed.     4. Type 1 diabetes mellitus without complication (CMS/[facility])        - Follow diabetic diet        - See eye doctor annually or as discussed        - Wear protective foot wear/no bare feet        - Check feet  regularly for calluses or ulcers        - Discussed risk of poorly controlled diabetes and long-term complications        - Exercise as tolerated for 30-45 minutes most days of the week. Gradually increase daily exercise if not currently active.        - Take insulin as prescribed    5. Labral tear of shoulder, degenerative, right  6. Acute pain of right shoulder  -     cyclobenzaprine (FLEXERIL) 5 MG tablet; Take 1 tablet by mouth 3 (Three) Times a Day As Needed for Muscle Spasms. May increase to 10 mg, if needed.  Dispense: 90 tablet; Refill: 0.  Advised to make sure orthopedic surgeon is aware he is taking this medication, as it may need to be discontinued after surgery.    Return in about 3 months (around 1/30/2021) for Recheck. Appointment may need to be rescheduled, if he is unable to drive but can be conducted via video visit.

## 2020-11-09 ENCOUNTER — APPOINTMENT (OUTPATIENT)
Dept: PREADMISSION TESTING | Facility: HOSPITAL | Age: 50
End: 2020-11-09

## 2020-11-09 PROCEDURE — U0004 COV-19 TEST NON-CDC HGH THRU: HCPCS

## 2020-11-09 PROCEDURE — C9803 HOPD COVID-19 SPEC COLLECT: HCPCS

## 2020-11-10 LAB — SARS-COV-2 RNA RESP QL NAA+PROBE: NOT DETECTED

## 2020-11-12 ENCOUNTER — OUTSIDE FACILITY SERVICE (OUTPATIENT)
Dept: ORTHOPEDIC SURGERY | Facility: CLINIC | Age: 50
End: 2020-11-12

## 2020-11-12 DIAGNOSIS — Z98.890 S/P SHOULDER SURGERY: Primary | ICD-10-CM

## 2020-11-12 PROCEDURE — 29806 SHO ARTHRS SRG CAPSULORRAPHY: CPT | Performed by: ORTHOPAEDIC SURGERY

## 2020-11-12 PROCEDURE — 29828 SHO ARTHRS SRG BICP TENODSIS: CPT | Performed by: ORTHOPAEDIC SURGERY

## 2020-11-12 RX ORDER — OXYCODONE HYDROCHLORIDE AND ACETAMINOPHEN 5; 325 MG/1; MG/1
1 TABLET ORAL EVERY 6 HOURS PRN
Qty: 20 TABLET | Refills: 0 | Status: SHIPPED | OUTPATIENT
Start: 2020-11-12 | End: 2021-02-08

## 2020-11-12 RX ORDER — ONDANSETRON 4 MG/1
4 TABLET, FILM COATED ORAL EVERY 8 HOURS PRN
Qty: 10 TABLET | Refills: 1 | Status: SHIPPED | OUTPATIENT
Start: 2020-11-12 | End: 2021-03-22

## 2020-11-13 ENCOUNTER — TELEPHONE (OUTPATIENT)
Dept: ORTHOPEDIC SURGERY | Facility: CLINIC | Age: 50
End: 2020-11-13

## 2020-11-13 NOTE — TELEPHONE ENCOUNTER
PATIENT IS IN SEVERE PAIN FOLLOWING SURGERY. PATIENT WOULD LIKE TO KNOW IF HE CAN INCREASE HIS PAIN MEDICATION DOSAGE OR IF THERE IS SOMETHING ELSE HE CAN TAKE. HE CAN BE REACHED -[phone].

## 2020-11-13 NOTE — TELEPHONE ENCOUNTER
Called pt to let him know he could double up on pain meds if need be for the next 24 hours. He understood.    Mariposa

## 2020-11-16 ENCOUNTER — OFFICE VISIT (OUTPATIENT)
Dept: ORTHOPEDIC SURGERY | Facility: CLINIC | Age: 50
End: 2020-11-16

## 2020-11-16 DIAGNOSIS — Z98.890 S/P SHOULDER SURGERY: Primary | ICD-10-CM

## 2020-11-16 DIAGNOSIS — S43.431A SUPERIOR GLENOID LABRUM LESION OF RIGHT SHOULDER, INITIAL ENCOUNTER: ICD-10-CM

## 2020-11-16 PROCEDURE — 99024 POSTOP FOLLOW-UP VISIT: CPT | Performed by: ORTHOPAEDIC SURGERY

## 2020-11-16 RX ORDER — IBUPROFEN 800 MG/1
800 TABLET ORAL 3 TIMES DAILY
Qty: 270 TABLET | Refills: 3 | Status: SHIPPED | OUTPATIENT
Start: 2020-11-16 | End: 2022-01-01

## 2020-11-16 NOTE — PROGRESS NOTES
Chief Complaint   Patient presents with   • Post-op     4 days s/p (R) shoulder arthroscopy with biceps tenodesis, posterior labrum repair 11/12/2020           HPI  He is follow-up right shoulder biceps tenodesis and posterior labral repair.  Doing well.      There were no vitals filed for this visit.      Physical Exam:  Also good distally neurovascular tact.        ICD-10-CM ICD-9-CM   1. S/P shoulder surgery  Z98.890 V45.89   2. Superior glenoid labrum lesion of right shoulder, initial encounter  S43.431A 840.7     I ordered ibuprofen 800 mg 1 p.o. 3 times daily as needed pain.  Continue the sling.  Follow-up in 3 weeks to start physical therapy

## 2020-11-17 DIAGNOSIS — I10 BENIGN ESSENTIAL HYPERTENSION: ICD-10-CM

## 2020-11-17 RX ORDER — LISINOPRIL 40 MG/1
40 TABLET ORAL DAILY
Qty: 90 TABLET | Refills: 1 | Status: SHIPPED | OUTPATIENT
Start: 2020-11-17 | End: 2021-01-01 | Stop reason: SDUPTHER

## 2020-12-07 ENCOUNTER — OFFICE VISIT (OUTPATIENT)
Dept: ORTHOPEDIC SURGERY | Facility: CLINIC | Age: 50
End: 2020-12-07

## 2020-12-07 DIAGNOSIS — S43.431A SUPERIOR GLENOID LABRUM LESION OF RIGHT SHOULDER, INITIAL ENCOUNTER: ICD-10-CM

## 2020-12-07 DIAGNOSIS — Z98.890 S/P SHOULDER SURGERY: Primary | ICD-10-CM

## 2020-12-07 PROCEDURE — 99024 POSTOP FOLLOW-UP VISIT: CPT | Performed by: ORTHOPAEDIC SURGERY

## 2020-12-07 NOTE — PROGRESS NOTES
Chief Complaint   Patient presents with   • Post-op     3 weeks s/p (R) shoulder arthroscopy with biceps tenodesis, posterior labrum repair 11/12/2020   Answers for HPI/ROS submitted by the patient on 12/7/2020   What is the primary reason for your visit?: Other  Please describe your symptoms.: Surgery follow up.  Have you had these symptoms before?: No  How long have you been having these symptoms?: 1-4 days          HPI  He is doing well without complaints.  He plans to do physical therapy in [place] or [place]      There were no vitals filed for this visit.      Physical Exam:  Portals look good.  Distally neurologically intact.        ICD-10-CM ICD-9-CM   1. S/P shoulder surgery  Z98.890 V45.89   2. Superior glenoid labrum lesion of right shoulder, initial encounter  S43.431A 840.7       Orders Placed This Encounter   Procedures   • Ambulatory Referral to Physical Therapy     Patient will do his physical therapy.  Discontinue the sling.  Follow-up in 1 month.

## 2020-12-12 DIAGNOSIS — G44.229 CHRONIC TENSION-TYPE HEADACHE, NOT INTRACTABLE: ICD-10-CM

## 2020-12-12 DIAGNOSIS — M25.511 ACUTE PAIN OF RIGHT SHOULDER: ICD-10-CM

## 2020-12-12 DIAGNOSIS — I10 BENIGN ESSENTIAL HYPERTENSION: ICD-10-CM

## 2020-12-12 RX ORDER — HYDROCHLOROTHIAZIDE 25 MG/1
TABLET ORAL
Qty: 30 TABLET | Refills: 0 | OUTPATIENT
Start: 2020-12-12

## 2020-12-13 RX ORDER — CYCLOBENZAPRINE HCL 5 MG
5 TABLET ORAL 3 TIMES DAILY PRN
Qty: 90 TABLET | Refills: 0 | Status: SHIPPED | OUTPATIENT
Start: 2020-12-13 | End: 2021-02-01 | Stop reason: SDUPTHER

## 2020-12-14 DIAGNOSIS — I10 BENIGN ESSENTIAL HYPERTENSION: ICD-10-CM

## 2020-12-14 RX ORDER — BUTALBITAL, ACETAMINOPHEN, CAFFEINE AND CODEINE PHOSPHATE 50; 325; 40; 30 MG/1; MG/1; MG/1; MG/1
CAPSULE ORAL
Qty: 90 CAPSULE | Refills: 1 | Status: SHIPPED | OUTPATIENT
Start: 2020-12-14 | End: 2021-02-10 | Stop reason: SDUPTHER

## 2020-12-15 RX ORDER — HYDROCHLOROTHIAZIDE 25 MG/1
25 TABLET ORAL DAILY
Qty: 90 TABLET | Refills: 0 | Status: SHIPPED | OUTPATIENT
Start: 2020-12-15 | End: 2021-03-18 | Stop reason: SDUPTHER

## 2020-12-29 DIAGNOSIS — E03.9 HYPOTHYROIDISM, UNSPECIFIED TYPE: ICD-10-CM

## 2020-12-29 RX ORDER — LEVOTHYROXINE SODIUM 0.05 MG/1
TABLET ORAL
Qty: 30 TABLET | Refills: 1 | Status: SHIPPED | OUTPATIENT
Start: 2020-12-29 | End: 2021-02-01 | Stop reason: SDUPTHER

## 2021-01-01 ENCOUNTER — TELEPHONE (OUTPATIENT)
Dept: ORTHOPEDIC SURGERY | Facility: CLINIC | Age: 51
End: 2021-01-01

## 2021-01-01 ENCOUNTER — APPOINTMENT (OUTPATIENT)
Dept: CT IMAGING | Facility: HOSPITAL | Age: 51
End: 2021-01-01

## 2021-01-01 ENCOUNTER — HOSPITAL ENCOUNTER (EMERGENCY)
Facility: HOSPITAL | Age: 51
Discharge: HOME | End: 2021-12-12
Attending: FAMILY MEDICINE | Admitting: FAMILY MEDICINE

## 2021-01-01 ENCOUNTER — PATIENT MESSAGE (OUTPATIENT)
Dept: INTERNAL MEDICINE | Facility: CLINIC | Age: 51
End: 2021-01-01

## 2021-01-01 ENCOUNTER — OFFICE VISIT (OUTPATIENT)
Dept: NEUROLOGY | Facility: CLINIC | Age: 51
End: 2021-01-01

## 2021-01-01 ENCOUNTER — OFFICE VISIT (OUTPATIENT)
Dept: INTERNAL MEDICINE | Facility: CLINIC | Age: 51
End: 2021-01-01

## 2021-01-01 VITALS
RESPIRATION RATE: 17 BRPM | SYSTOLIC BLOOD PRESSURE: 109 MMHG | HEART RATE: 123 BPM | HEIGHT: 70 IN | TEMPERATURE: 98.1 F | DIASTOLIC BLOOD PRESSURE: 75 MMHG | OXYGEN SATURATION: 95 % | BODY MASS INDEX: 29.49 KG/M2 | WEIGHT: 206 LBS

## 2021-01-01 VITALS
BODY MASS INDEX: 31.92 KG/M2 | DIASTOLIC BLOOD PRESSURE: 80 MMHG | HEART RATE: 87 BPM | SYSTOLIC BLOOD PRESSURE: 110 MMHG | HEIGHT: 70 IN | WEIGHT: 223 LBS | OXYGEN SATURATION: 96 %

## 2021-01-01 VITALS
DIASTOLIC BLOOD PRESSURE: 60 MMHG | HEART RATE: 65 BPM | HEIGHT: 70 IN | SYSTOLIC BLOOD PRESSURE: 100 MMHG | OXYGEN SATURATION: 99 % | WEIGHT: 208 LBS | BODY MASS INDEX: 29.78 KG/M2 | TEMPERATURE: 97.4 F

## 2021-01-01 DIAGNOSIS — I10 BENIGN ESSENTIAL HYPERTENSION: ICD-10-CM

## 2021-01-01 DIAGNOSIS — G44.229 CHRONIC TENSION-TYPE HEADACHE, NOT INTRACTABLE: ICD-10-CM

## 2021-01-01 DIAGNOSIS — Z13.29 SCREENING FOR ENDOCRINE, METABOLIC AND IMMUNITY DISORDER: ICD-10-CM

## 2021-01-01 DIAGNOSIS — M25.511 ACUTE PAIN OF RIGHT SHOULDER: ICD-10-CM

## 2021-01-01 DIAGNOSIS — E10.9 TYPE 1 DIABETES MELLITUS WITHOUT COMPLICATION (HCC): ICD-10-CM

## 2021-01-01 DIAGNOSIS — E03.9 HYPOTHYROIDISM, UNSPECIFIED TYPE: ICD-10-CM

## 2021-01-01 DIAGNOSIS — G44.319 ACUTE POST-TRAUMATIC HEADACHE, NOT INTRACTABLE: ICD-10-CM

## 2021-01-01 DIAGNOSIS — E10.9 TYPE 1 DIABETES MELLITUS WITHOUT COMPLICATION (HCC): Primary | ICD-10-CM

## 2021-01-01 DIAGNOSIS — E78.5 HYPERLIPIDEMIA, UNSPECIFIED HYPERLIPIDEMIA TYPE: ICD-10-CM

## 2021-01-01 DIAGNOSIS — G43.009 MIGRAINE WITHOUT AURA AND WITHOUT STATUS MIGRAINOSUS, NOT INTRACTABLE: ICD-10-CM

## 2021-01-01 DIAGNOSIS — S09.90XA CLOSED HEAD INJURY, INITIAL ENCOUNTER: ICD-10-CM

## 2021-01-01 DIAGNOSIS — J30.89 ENVIRONMENTAL AND SEASONAL ALLERGIES: ICD-10-CM

## 2021-01-01 DIAGNOSIS — Z13.228 SCREENING FOR ENDOCRINE, METABOLIC AND IMMUNITY DISORDER: ICD-10-CM
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
## 2021-01-01 DIAGNOSIS — E66.3 OVERWEIGHT WITH BODY MASS INDEX (BMI) OF 29 TO 29.9 IN ADULT: ICD-10-CM

## 2021-01-01 DIAGNOSIS — Z13.0 SCREENING FOR ENDOCRINE, METABOLIC AND IMMUNITY DISORDER: ICD-10-CM

## 2021-01-01 DIAGNOSIS — Z13.0 SCREENING FOR DISORDER OF BLOOD AND BLOOD-FORMING ORGANS: ICD-10-CM

## 2021-01-01 DIAGNOSIS — Z00.00 ANNUAL PHYSICAL EXAM: Primary | ICD-10-CM

## 2021-01-01 DIAGNOSIS — G44.229 CHRONIC TENSION-TYPE HEADACHE, NOT INTRACTABLE: Primary | ICD-10-CM

## 2021-01-01 DIAGNOSIS — E03.9 HYPOTHYROIDISM, UNSPECIFIED TYPE: Primary | ICD-10-CM

## 2021-01-01 LAB
ALBUMIN SERPL-MCNC: 4.3 G/DL (ref 3.5–5.2)
ALBUMIN/GLOB SERPL: 1.8 G/DL
ALP SERPL-CCNC: 92 U/L (ref 39–117)
ALT SERPL W P-5'-P-CCNC: 27 U/L (ref 1–41)
ANION GAP SERPL CALCULATED.3IONS-SCNC: 9.4 MMOL/L (ref 5–15)
AST SERPL-CCNC: 20 U/L (ref 1–40)
BASOPHILS # BLD AUTO: 0.15 10*3/MM3 (ref 0–0.2)
BASOPHILS NFR BLD AUTO: 2 % (ref 0–1.5)
BILIRUB SERPL-MCNC: 0.3 MG/DL (ref 0–1.2)
BUN SERPL-MCNC: 21 MG/DL (ref 6–20)
BUN SERPL-MCNC: 26 MG/DL (ref 6–20)
BUN/CREAT SERPL: 17.1 (ref 7–25)
BUN/CREAT SERPL: 18.1 (ref 7–25)
CALCIUM SERPL-MCNC: 10 MG/DL (ref 8.6–10.5)
CALCIUM SPEC-SCNC: 9.4 MG/DL (ref 8.6–10.5)
CHLORIDE SERPL-SCNC: 99 MMOL/L (ref 98–107)
CHLORIDE SERPL-SCNC: 99 MMOL/L (ref 98–107)
CO2 SERPL-SCNC: 26.6 MMOL/L (ref 22–29)
CO2 SERPL-SCNC: 28.2 MMOL/L (ref 22–29)
CREAT SERPL-MCNC: 1.23 MG/DL (ref 0.76–1.27)
CREAT SERPL-MCNC: 1.44 MG/DL (ref 0.76–1.27)
DEPRECATED RDW RBC AUTO: 41.1 FL (ref 37–54)
EOSINOPHIL # BLD AUTO: 0.61 10*3/MM3 (ref 0–0.4)
EOSINOPHIL NFR BLD AUTO: 8 % (ref 0.3–6.2)
ERYTHROCYTE [DISTWIDTH] IN BLOOD BY AUTOMATED COUNT: 12.1 % (ref 12.3–15.4)
GFR SERPL CREATININE-BSD FRML MDRD: 52 ML/MIN/1.73
GLOBULIN UR ELPH-MCNC: 2.4 GM/DL
GLUCOSE SERPL-MCNC: 204 MG/DL (ref 65–99)
GLUCOSE SERPL-MCNC: 63 MG/DL (ref 65–99)
HCT VFR BLD AUTO: 41.5 % (ref 37.5–51)
HCV AB S/CO SERPL IA: 0.1 S/CO RATIO (ref 0–0.9)
HGB BLD-MCNC: 14.2 G/DL (ref 13–17.7)
IMM GRANULOCYTES # BLD AUTO: 0.02 10*3/MM3 (ref 0–0.05)
IMM GRANULOCYTES NFR BLD AUTO: 0.3 % (ref 0–0.5)
LYMPHOCYTES # BLD AUTO: 3.13 10*3/MM3 (ref 0.7–3.1)
LYMPHOCYTES NFR BLD AUTO: 40.9 % (ref 19.6–45.3)
MCH RBC QN AUTO: 31.6 PG (ref 26.6–33)
MCHC RBC AUTO-ENTMCNC: 34.2 G/DL (ref 31.5–35.7)
MCV RBC AUTO: 92.2 FL (ref 79–97)
MONOCYTES # BLD AUTO: 0.6 10*3/MM3 (ref 0.1–0.9)
MONOCYTES NFR BLD AUTO: 7.8 % (ref 5–12)
NEUTROPHILS NFR BLD AUTO: 3.15 10*3/MM3 (ref 1.7–7)
NEUTROPHILS NFR BLD AUTO: 41 % (ref 42.7–76)
NRBC BLD AUTO-RTO: 0 /100 WBC (ref 0–0.2)
PLATELET # BLD AUTO: 314 10*3/MM3 (ref 140–450)
PMV BLD AUTO: 9.2 FL (ref 6–12)
POTASSIUM SERPL-SCNC: 4 MMOL/L (ref 3.5–5.2)
POTASSIUM SERPL-SCNC: 4.7 MMOL/L (ref 3.5–5.2)
PROT SERPL-MCNC: 6.7 G/DL (ref 6–8.5)
RBC # BLD AUTO: 4.5 10*6/MM3 (ref 4.14–5.8)
SODIUM SERPL-SCNC: 135 MMOL/L (ref 136–145)
SODIUM SERPL-SCNC: 138 MMOL/L (ref 136–145)
TSH SERPL DL<=0.005 MIU/L-ACNC: 16.3 UIU/ML (ref 0.27–4.2)
WBC NRBC COR # BLD: 7.66 10*3/MM3 (ref 3.4–10.8)

## 2021-01-01 PROCEDURE — 96374 THER/PROPH/DIAG INJ IV PUSH: CPT

## 2021-01-01 PROCEDURE — 96375 TX/PRO/DX INJ NEW DRUG ADDON: CPT

## 2021-01-01 PROCEDURE — 99396 PREV VISIT EST AGE 40-64: CPT | Performed by: NURSE PRACTITIONER

## 2021-01-01 PROCEDURE — 99283 EMERGENCY DEPT VISIT LOW MDM: CPT

## 2021-01-01 PROCEDURE — 70450 CT HEAD/BRAIN W/O DYE: CPT

## 2021-01-01 PROCEDURE — 80053 COMPREHEN METABOLIC PANEL: CPT | Performed by: PHYSICIAN ASSISTANT

## 2021-01-01 PROCEDURE — 25010000002 DROPERIDOL PER 5 MG: Performed by: PHYSICIAN ASSISTANT

## 2021-01-01 PROCEDURE — 85025 COMPLETE CBC W/AUTO DIFF WBC: CPT | Performed by: PHYSICIAN ASSISTANT

## 2021-01-01 PROCEDURE — 25010000002 DIPHENHYDRAMINE PER 50 MG: Performed by: PHYSICIAN ASSISTANT

## 2021-01-01 PROCEDURE — 72125 CT NECK SPINE W/O DYE: CPT

## 2021-01-01 PROCEDURE — 25010000002 ONDANSETRON PER 1 MG: Performed by: PHYSICIAN ASSISTANT

## 2021-01-01 PROCEDURE — 99214 OFFICE O/P EST MOD 30 MIN: CPT | Performed by: PSYCHIATRY & NEUROLOGY

## 2021-01-01 RX ORDER — BUTALBITAL, ACETAMINOPHEN, CAFFEINE AND CODEINE PHOSPHATE 50; 325; 40; 30 MG/1; MG/1; MG/1; MG/1
CAPSULE ORAL
Qty: 90 CAPSULE | Refills: 3 | Status: SHIPPED | OUTPATIENT
Start: 2021-01-01 | End: 2022-01-01 | Stop reason: SDUPTHER

## 2021-01-01 RX ORDER — LEVOTHYROXINE SODIUM 0.07 MG/1
75 TABLET ORAL DAILY
Qty: 90 TABLET | Refills: 0 | Status: SHIPPED | OUTPATIENT
Start: 2021-01-01 | End: 2021-01-01 | Stop reason: SDUPTHER

## 2021-01-01 RX ORDER — DROPERIDOL 2.5 MG/ML
2.5 INJECTION, SOLUTION INTRAMUSCULAR; INTRAVENOUS ONCE
Status: COMPLETED | OUTPATIENT
Start: 2021-01-01 | End: 2021-01-01

## 2021-01-01 RX ORDER — CYCLOBENZAPRINE HCL 5 MG
5 TABLET ORAL 3 TIMES DAILY PRN
Qty: 180 TABLET | Refills: 0 | Status: SHIPPED | OUTPATIENT
Start: 2021-01-01 | End: 2021-01-01

## 2021-01-01 RX ORDER — LEVOTHYROXINE SODIUM 0.07 MG/1
75 TABLET ORAL DAILY
Qty: 90 TABLET | Refills: 0 | Status: SHIPPED | OUTPATIENT
Start: 2021-01-01 | End: 2022-01-01 | Stop reason: SDUPTHER

## 2021-01-01 RX ORDER — SODIUM CHLORIDE 0.9 % (FLUSH) 0.9 %
10 SYRINGE (ML) INJECTION AS NEEDED
Status: DISCONTINUED | OUTPATIENT
Start: 2021-01-01 | End: 2021-01-01 | Stop reason: HOSPADM

## 2021-01-01 RX ORDER — PROCHLORPERAZINE 25 MG/1
SUPPOSITORY RECTAL
Qty: 3 EACH | Refills: 2 | Status: SHIPPED | OUTPATIENT
Start: 2021-01-01 | End: 2021-01-01

## 2021-01-01 RX ORDER — AMITRIPTYLINE HYDROCHLORIDE 10 MG/1
TABLET, FILM COATED ORAL
Qty: 60 TABLET | Refills: 11 | Status: SHIPPED | OUTPATIENT
Start: 2021-01-01 | End: 2021-01-01 | Stop reason: SDUPTHER

## 2021-01-01 RX ORDER — BUTALBITAL, ACETAMINOPHEN, CAFFEINE AND CODEINE PHOSPHATE 50; 325; 40; 30 MG/1; MG/1; MG/1; MG/1
CAPSULE ORAL
Qty: 90 CAPSULE | Refills: 3 | Status: SHIPPED | OUTPATIENT
Start: 2021-01-01 | End: 2021-01-01 | Stop reason: SDUPTHER

## 2021-01-01 RX ORDER — AMITRIPTYLINE HYDROCHLORIDE 10 MG/1
30 TABLET, FILM COATED ORAL NIGHTLY
Qty: 60 TABLET | Refills: 11 | Status: SHIPPED | OUTPATIENT
Start: 2021-01-01 | End: 2021-01-01 | Stop reason: SDUPTHER

## 2021-01-01 RX ORDER — DIPHENHYDRAMINE HYDROCHLORIDE 50 MG/ML
25 INJECTION INTRAMUSCULAR; INTRAVENOUS ONCE
Status: COMPLETED | OUTPATIENT
Start: 2021-01-01 | End: 2021-01-01

## 2021-01-01 RX ORDER — PROCHLORPERAZINE 25 MG/1
1 SUPPOSITORY RECTAL
Qty: 1 EACH | Refills: 0 | Status: SHIPPED | OUTPATIENT
Start: 2021-01-01 | End: 2021-01-01

## 2021-01-01 RX ORDER — LISINOPRIL 40 MG/1
40 TABLET ORAL DAILY
Qty: 90 TABLET | Refills: 1 | Status: SHIPPED | OUTPATIENT
Start: 2021-01-01 | End: 2021-01-01 | Stop reason: DRUGHIGH

## 2021-01-01 RX ORDER — AMITRIPTYLINE HYDROCHLORIDE 10 MG/1
30 TABLET, FILM COATED ORAL NIGHTLY
Qty: 90 TABLET | Refills: 11 | Status: SHIPPED | OUTPATIENT
Start: 2021-01-01 | End: 2022-01-01 | Stop reason: SDUPTHER

## 2021-01-01 RX ORDER — ONDANSETRON 2 MG/ML
4 INJECTION INTRAMUSCULAR; INTRAVENOUS ONCE
Status: COMPLETED | OUTPATIENT
Start: 2021-01-01 | End: 2021-01-01

## 2021-01-01 RX ORDER — LISINOPRIL 20 MG/1
20 TABLET ORAL DAILY
Qty: 90 TABLET | Refills: 1 | Status: SHIPPED | OUTPATIENT
Start: 2021-01-01 | End: 2022-01-01 | Stop reason: SDUPTHER

## 2021-01-01 RX ADMIN — ONDANSETRON HYDROCHLORIDE 4 MG: 2 INJECTION, SOLUTION INTRAMUSCULAR; INTRAVENOUS at 23:18

## 2021-01-01 RX ADMIN — DIPHENHYDRAMINE HYDROCHLORIDE 25 MG: 50 INJECTION INTRAMUSCULAR; INTRAVENOUS at 00:09

## 2021-01-01 RX ADMIN — SODIUM CHLORIDE 1000 ML: 9 INJECTION, SOLUTION INTRAVENOUS at 23:19

## 2021-01-01 RX ADMIN — DROPERIDOL 2.5 MG: 2.5 INJECTION, SOLUTION INTRAMUSCULAR; INTRAVENOUS at 00:08

## 2021-01-11 ENCOUNTER — OFFICE VISIT (OUTPATIENT)
Dept: ORTHOPEDIC SURGERY | Facility: CLINIC | Age: 51
End: 2021-01-11

## 2021-01-11 DIAGNOSIS — S43.431A SUPERIOR GLENOID LABRUM LESION OF RIGHT SHOULDER, INITIAL ENCOUNTER: ICD-10-CM

## 2021-01-11 DIAGNOSIS — Z98.890 S/P SHOULDER SURGERY: Primary | ICD-10-CM

## 2021-01-11 PROCEDURE — 99024 POSTOP FOLLOW-UP VISIT: CPT | Performed by: ORTHOPAEDIC SURGERY

## 2021-01-11 NOTE — PROGRESS NOTES
Chief Complaint   Patient presents with   • Post-op     1 month recheck - s/p (R) shoulder arthroscopy with biceps tenodesis, posterior labrum repair 11/12/2020           HPI    The patient is doing well.  No complaints.    There were no vitals filed for this visit.      Physical Exam:    He has near full forward flexion abduction and internal rotation.        ICD-10-CM ICD-9-CM   1. S/P shoulder surgery  Z98.890 V45.89   2. Superior glenoid labrum lesion of right shoulder, initial encounter  S43.431A 840.7       Orders Placed This Encounter   Procedures   • Ambulatory Referral to Physical Therapy     He is doing well.  He will continue physical therapy at [facility] physical therapy in [place].  Follow-up in 1 month.

## 2021-01-26 ENCOUNTER — TELEPHONE (OUTPATIENT)
Dept: ORTHOPEDIC SURGERY | Facility: CLINIC | Age: 51
End: 2021-01-26

## 2021-01-26 NOTE — TELEPHONE ENCOUNTER
Supervisor at [facility] has been contacted regarding this request. They should be reaching out to the patient shortly.

## 2021-01-26 NOTE — TELEPHONE ENCOUNTER
----- Message from Sina Reyez sent at 1/26/2021 12:26 PM EST -----  Regarding: Non-Urgent Medical Question  Contact: [phone]  Hello,     My  is having issues obtaining my medical records relating to my shoulder surgery, dates, and treatment times. Please release these and send to:     [email or web address]    Thank you

## 2021-01-27 NOTE — TELEPHONE ENCOUNTER
Spoke to patient to confirm that [facility] had mailed patient's records on 1/14/21 to Shawn Escobar at [address] [facility] [address] [place], [place] [address]. Patient understood and will call [facility] at [phone] with any follow up questions concerning his records.

## 2021-02-01 DIAGNOSIS — M25.511 ACUTE PAIN OF RIGHT SHOULDER: ICD-10-CM

## 2021-02-01 DIAGNOSIS — E03.9 HYPOTHYROIDISM, UNSPECIFIED TYPE: ICD-10-CM

## 2021-02-01 RX ORDER — LEVOTHYROXINE SODIUM 0.05 MG/1
50 TABLET ORAL DAILY
Qty: 30 TABLET | Refills: 0 | Status: SHIPPED | OUTPATIENT
Start: 2021-02-01 | End: 2021-03-15 | Stop reason: SDUPTHER

## 2021-02-01 RX ORDER — CYCLOBENZAPRINE HCL 5 MG
5 TABLET ORAL 3 TIMES DAILY PRN
Qty: 60 TABLET | Refills: 0 | Status: SHIPPED | OUTPATIENT
Start: 2021-02-01 | End: 2021-03-22 | Stop reason: SDUPTHER

## 2021-02-01 NOTE — TELEPHONE ENCOUNTER
Please advise patient an appointment w/labs needed for future refills, to be certain we have the right dose of medication.  30 day supply sent. Appointment can be video.

## 2021-02-01 NOTE — TELEPHONE ENCOUNTER
Please advise patient appointment needed for refill.  (note sent regarding levothyroxine rx, today, as well).

## 2021-02-08 ENCOUNTER — OFFICE VISIT (OUTPATIENT)
Dept: ORTHOPEDIC SURGERY | Facility: CLINIC | Age: 51
End: 2021-02-08

## 2021-02-08 DIAGNOSIS — S43.431A SUPERIOR GLENOID LABRUM LESION OF RIGHT SHOULDER, INITIAL ENCOUNTER: ICD-10-CM

## 2021-02-08 DIAGNOSIS — Z98.890 S/P SHOULDER SURGERY: Primary | ICD-10-CM

## 2021-02-08 DIAGNOSIS — R29.898 SHOULDER WEAKNESS: ICD-10-CM

## 2021-02-08 PROCEDURE — 99024 POSTOP FOLLOW-UP VISIT: CPT | Performed by: ORTHOPAEDIC SURGERY

## 2021-02-08 NOTE — PROGRESS NOTES
[facility]    Subjective     CC: Follow-up (1 months follow up; 12 weeks status post Right shoulder arthroscopy with biceps tenodesis, posterior labrum repair 11/12/2020)      PAIGE Reyez is a 50 y.o. male.  He is doing well.  He says his motion has gotten better.  He still has some weakness.  He goes to [facility] physical therapy in [place].    Review of Systems   Constitutional: Negative.  Negative for chills, fatigue and fever.   HENT: Negative.  Negative for congestion and dental problem.    Eyes: Negative.  Negative for blurred vision.   Respiratory: Negative.  Negative for shortness of breath.    Cardiovascular: Negative.  Negative for leg swelling.   Gastrointestinal: Negative.  Negative for abdominal pain.   Endocrine: Negative.  Negative for polyuria.   Genitourinary: Negative.  Negative for difficulty urinating.   Musculoskeletal: Positive for arthralgias.   Skin: Negative.    Allergic/Immunologic: Negative.    Neurological: Negative.    Hematological: Negative.  Negative for adenopathy.   Psychiatric/Behavioral: Negative.  Negative for behavioral problems.       ROS:    Constiutional:Pt denies fever, chills, nausea, or vomiting.  MSK:as above      Objective      Past Medical History  Past Medical History:   Diagnosis Date   • Acute sinusitis    • Acute stress disorder    • Anxiety    • Diabetes mellitus (CMS/HCC)    • Hypertension    • Migraine    • MRSA infection    • Rib pain on left side    • Toe injury    • Verruca    • Viral gastroenteritis          Physical Exam  There were no vitals taken for this visit.    There is no height or weight on file to calculate BMI.    Patient is well nourished and well developed.        Ortho Exam  Right shoulder with near full motion but is very weak with 4- out of 5 strength    Imaging/Labs/EMG Reviewed:  Imaging Results (Last 24 Hours)     ** No results found for the last 24 hours. **          Assessment:  1. S/P shoulder  surgery    2. Superior glenoid labrum lesion of right shoulder, initial encounter    3. Shoulder weakness        Plan:  1. Recommend over the counter anti-inflammatories for pain and/or swelling  2. He will continue physical therapy and follow-up in 4 weeks.    Follow Up:   No follow-ups on file.      Medical Decision Making  Management Options : Low - OTC Drugs and OT or PT Therapy         Tremayne Waters M.D., [facility]  Orthopedic Surgeon  Fellowship Trained Sports Medicine  [facility]  Orthopedics and [facility] Medicine  [address] [place] [address], [address]  [place] [address]  Answers for HPI/ROS submitted by the patient on 2/8/2021   What is the primary reason for your visit?: Other  Please describe your symptoms.: Follow up visit.  Have you had these symptoms before?: Yes  How long have you been having these symptoms?: Greater than 2 weeks

## 2021-02-10 DIAGNOSIS — G44.229 CHRONIC TENSION-TYPE HEADACHE, NOT INTRACTABLE: ICD-10-CM

## 2021-02-10 RX ORDER — BUTALBITAL, ACETAMINOPHEN, CAFFEINE AND CODEINE PHOSPHATE 50; 325; 40; 30 MG/1; MG/1; MG/1; MG/1
CAPSULE ORAL
Qty: 90 CAPSULE | Refills: 1 | Status: SHIPPED | OUTPATIENT
Start: 2021-02-10 | End: 2021-04-05 | Stop reason: SDUPTHER

## 2021-03-15 DIAGNOSIS — E03.9 HYPOTHYROIDISM, UNSPECIFIED TYPE: ICD-10-CM

## 2021-03-16 ENCOUNTER — TELEPHONE (OUTPATIENT)
Dept: ORTHOPEDIC SURGERY | Facility: CLINIC | Age: 51
End: 2021-03-16

## 2021-03-16 RX ORDER — LEVOTHYROXINE SODIUM 0.05 MG/1
50 TABLET ORAL DAILY
Qty: 30 TABLET | Refills: 0 | Status: SHIPPED | OUTPATIENT
Start: 2021-03-16 | End: 2021-04-19 | Stop reason: SDUPTHER

## 2021-03-16 NOTE — TELEPHONE ENCOUNTER
I called patient and explained that it is normal to have pain as they increase activity in physical therapy. I advised him to continue going to PT but to tell them how he was feeling when he went in today. He agreed to this and just wanted to make sure he was doing the right thing.  Kristan

## 2021-03-16 NOTE — TELEPHONE ENCOUNTER
Provider: RANDALL LO  Caller: JUAN M HERNANDEZ  Relationship to Patient: SELF    Phone Number: [phone]    Reason for Call: PATIENT HAS CONCERNS OVER NEW PAINS IN RIGHT SHOULDER AFTER INTENSE PHYSICAL THERAPY SESSION- WANTS TO KNOW IF HE SHOULD CONTINUE THE PHYSICAL THERAPY AT THIS POINT    PATIENT HAS 2 MORE APPT SCHEDULED BEFORE HIS NEXT FOLLOW UP WITH DR LO.  PATIENT DOES NOT WANT TO RISK RE-INJURYING HIS SHOULDER

## 2021-03-18 DIAGNOSIS — I10 BENIGN ESSENTIAL HYPERTENSION: ICD-10-CM

## 2021-03-18 RX ORDER — HYDROCHLOROTHIAZIDE 25 MG/1
25 TABLET ORAL DAILY
Qty: 30 TABLET | Refills: 0 | Status: SHIPPED | OUTPATIENT
Start: 2021-03-18 | End: 2021-04-12 | Stop reason: SDUPTHER

## 2021-03-22 ENCOUNTER — OFFICE VISIT (OUTPATIENT)
Dept: ORTHOPEDIC SURGERY | Facility: CLINIC | Age: 51
End: 2021-03-22

## 2021-03-22 VITALS
BODY MASS INDEX: 32.13 KG/M2 | HEART RATE: 92 BPM | WEIGHT: 224.4 LBS | HEIGHT: 70 IN | DIASTOLIC BLOOD PRESSURE: 81 MMHG | SYSTOLIC BLOOD PRESSURE: 141 MMHG

## 2021-03-22 DIAGNOSIS — Z98.890 S/P SHOULDER SURGERY: Primary | ICD-10-CM

## 2021-03-22 DIAGNOSIS — S43.431A SUPERIOR GLENOID LABRUM LESION OF RIGHT SHOULDER, INITIAL ENCOUNTER: ICD-10-CM

## 2021-03-22 DIAGNOSIS — M25.511 ACUTE PAIN OF RIGHT SHOULDER: ICD-10-CM

## 2021-03-22 PROCEDURE — 99212 OFFICE O/P EST SF 10 MIN: CPT | Performed by: ORTHOPAEDIC SURGERY

## 2021-03-22 RX ORDER — CYCLOBENZAPRINE HCL 5 MG
5 TABLET ORAL 3 TIMES DAILY PRN
Qty: 180 TABLET | Refills: 0 | Status: SHIPPED | OUTPATIENT
Start: 2021-03-22 | End: 2021-01-01 | Stop reason: SDUPTHER

## 2021-03-22 NOTE — PROGRESS NOTES
"      [facility]    Subjective     CC: Follow-up (6 week follow up; 4 months status post Right shoulder arthroscopy with biceps tenodesis, posterior labrum repair 11/12/2020)      PAIGE Reyez is a 50 y.o. male.  He is doing great.  No problems.    Review of Systems   Constitutional: Negative.    HENT: Negative.    Eyes: Negative.    Respiratory: Negative.    Cardiovascular: Negative.    Gastrointestinal: Negative.    Endocrine: Negative.    Genitourinary: Negative.    Musculoskeletal: Positive for arthralgias.   Skin: Negative.    Allergic/Immunologic: Negative.    Neurological: Negative.    Hematological: Negative.    Psychiatric/Behavioral: Negative.        ROS:    Constiutional:Pt denies fever, chills, nausea, or vomiting.  MSK:as above      Objective      Past Medical History  Past Medical History:   Diagnosis Date   • Acute sinusitis    • Acute stress disorder    • Anxiety    • Diabetes mellitus (CMS/HCC)    • Hypertension    • Migraine    • MRSA infection    • Rib pain on left side    • Toe injury    • Verruca    • Viral gastroenteritis          Physical Exam  /81   Pulse 92   Ht 177.8 cm (70\")   Wt 102 kg (224 lb 6.4 oz)   BMI 32.20 kg/m²     Body mass index is 32.2 kg/m².    Patient is well nourished and well developed.        Ortho Exam  He has full motion full-strength.  He is doing well    Imaging/Labs/EMG Reviewed:  Imaging Results (Last 24 Hours)     ** No results found for the last 24 hours. **          Assessment:  1. S/P shoulder surgery    2. Superior glenoid labrum lesion of right shoulder, initial encounter        Plan:  1. Recommend over the counter anti-inflammatories for pain and/or swelling  2. He is doing well.  He will follow-up as needed    Follow Up:   Return if symptoms worsen or fail to improve.      Medical Decision Making  Management Options : Low - OTC Drugs        Tremayne Waters M.D., [facility]  Orthopedic Surgeon  Fellowship Trained " Sports Medicine  [facility]  Orthopedics and Sports Medicine  [address], [address]  [place] [address]

## 2021-03-25 ENCOUNTER — IMMUNIZATION (OUTPATIENT)
Dept: VACCINE CLINIC | Facility: HOSPITAL | Age: 51
End: 2021-03-25

## 2021-03-25 PROCEDURE — 0001A: CPT | Performed by: INTERNAL MEDICINE

## 2021-03-25 PROCEDURE — 91300 HC SARSCOV02 VAC 30MCG/0.3ML IM: CPT | Performed by: INTERNAL MEDICINE

## 2021-04-05 DIAGNOSIS — G44.229 CHRONIC TENSION-TYPE HEADACHE, NOT INTRACTABLE: ICD-10-CM

## 2021-04-05 RX ORDER — BUTALBITAL, ACETAMINOPHEN, CAFFEINE AND CODEINE PHOSPHATE 50; 325; 40; 30 MG/1; MG/1; MG/1; MG/1
CAPSULE ORAL
Qty: 90 CAPSULE | Refills: 3 | Status: SHIPPED | OUTPATIENT
Start: 2021-04-05 | End: 2021-01-01 | Stop reason: SDUPTHER

## 2021-04-09 ENCOUNTER — OFFICE VISIT (OUTPATIENT)
Dept: INTERNAL MEDICINE | Facility: CLINIC | Age: 51
End: 2021-04-09

## 2021-04-09 VITALS
DIASTOLIC BLOOD PRESSURE: 82 MMHG | SYSTOLIC BLOOD PRESSURE: 130 MMHG | WEIGHT: 225.5 LBS | OXYGEN SATURATION: 98 % | HEIGHT: 70 IN | HEART RATE: 113 BPM | TEMPERATURE: 96.6 F | BODY MASS INDEX: 32.28 KG/M2

## 2021-04-09 DIAGNOSIS — E03.9 HYPOTHYROIDISM, UNSPECIFIED TYPE: ICD-10-CM

## 2021-04-09 DIAGNOSIS — R79.9 ELEVATED BUN: ICD-10-CM

## 2021-04-09 DIAGNOSIS — J30.89 ENVIRONMENTAL AND SEASONAL ALLERGIES: ICD-10-CM

## 2021-04-09 DIAGNOSIS — E10.9 TYPE 1 DIABETES MELLITUS WITHOUT COMPLICATION (HCC): Primary | ICD-10-CM

## 2021-04-09 DIAGNOSIS — Z11.59 ENCOUNTER FOR HEPATITIS C SCREENING TEST FOR LOW RISK PATIENT: ICD-10-CM

## 2021-04-09 LAB
HBA1C MFR BLD: 7.4 %
POC CREATININE URINE: 300
POC MICROALBUMIN URINE: 10

## 2021-04-09 PROCEDURE — 82044 UR ALBUMIN SEMIQUANTITATIVE: CPT | Performed by: NURSE PRACTITIONER

## 2021-04-09 PROCEDURE — 99214 OFFICE O/P EST MOD 30 MIN: CPT | Performed by: NURSE PRACTITIONER

## 2021-04-09 PROCEDURE — 83036 HEMOGLOBIN GLYCOSYLATED A1C: CPT | Performed by: NURSE PRACTITIONER

## 2021-04-09 RX ORDER — AZELASTINE 1 MG/ML
2 SPRAY, METERED NASAL 2 TIMES DAILY
Qty: 30 ML | Refills: 11 | Status: SHIPPED | OUTPATIENT
Start: 2021-04-09

## 2021-04-09 NOTE — PROGRESS NOTES
Date: 2021    Name: Sina Reyez  : 1970    Chief Complaint:   Chief Complaint   Patient presents with   • Diabetes     htn 3 month follow up        HPI:  Sina Reyez is a 50 y.o. male presents for follow up of T1DM, that was diagnosed 12 years ago. He continues to take novolin R, 70/30. Monitors blood glucose several times throughout the day. Most recent A1c was 6.4 on 10/23/2020. Patient denies foot ulcerations, hyperglycemia, hypoglycemia, nausea, paresthesia of the feet, polydipsia, polyuria, polyphagia, visual disturbances and weight loss.     Most recent TSH 7.6. Currently taking 50 mcg of levothyroxine daily as prescribed. Denies abnormal fatigue, weight change, temperature intolerance, hair/skin/nail changes, bowel habit changes, palpitations, anxiety, sore throat, hoarseness.     Environmental and seasonal allergies are worsening. He has been taking OTC antihistamine, using Flonase nasal spray without relief of symptoms. No fever, chills, fatigue. He is experiencing nasal congestion, sneezing, sinus pressure.    Answers for HPI/ROS submitted by the patient on 2021  What is the primary reason for your visit?: Diabetes  Diabetes type: type 1  MedicAlert ID: No  Disease duration: 12 years  foot paresthesias: No  foot ulcerations: No  visual change: No  Symptom course: stable  headaches: No  hunger: No  mood changes: No  sleepiness: No  sweats: No  blackouts: No  hospitalization: No  nocturnal hypoglycemia: No  required assistance: No  required glucagon: No  CVA: No  heart disease: No  impotence: No  nephropathy: No  peripheral neuropathy: No  PVD: No  Current treatments: insulin injections  Treatment compliance: all of the time  Dose schedule: pre-breakfast, pre-dinner  Given by: patient  Injection sites: abdominal wall  Home blood tests: 1-2 x per day  Home urines: <1 x per month  Monitoring compliance: excellent  Blood glucose trend: no change  breakfast time: after 10  "am  breakfast glucose level:   lunch time: 12-1 pm  lunch glucose level: 130-140  dinner time: 6-7 pm  dinner glucose level: 130-140  High score: 180-200  Overall: 130-140  Weight trend: stable  Current diet: generally healthy  Meal planning: carbohydrate counting  Exercise: three times a week  Dietitian visit: No  Eye exam current: Yes  Sees podiatrist: No      History: The following portions of the patient's history were reviewed and updated as appropriate: allergies, current medications, past medical history, family history, surgical history, social history and problem list.        VS:  Vitals:    04/09/21 1552   BP: 130/82   Pulse: 113   Temp: 96.6 °F (35.9 °C)   TempSrc: Infrared   SpO2: 98%   Weight: 102 kg (225 lb 8 oz)   Height: 177.8 cm (70\")     Body mass index is 32.36 kg/m².  PE:  Physical Exam  Constitutional:       Appearance: He is not ill-appearing.   HENT:      Head: Normocephalic.      Right Ear: External ear normal.      Left Ear: External ear normal.      Nose:      Right Sinus: No maxillary sinus tenderness or frontal sinus tenderness.      Left Sinus: No maxillary sinus tenderness or frontal sinus tenderness.      Comments: Patient's face mask left in place during exam  Eyes:      Conjunctiva/sclera: Conjunctivae normal.      Pupils: Pupils are equal, round, and reactive to light.   Cardiovascular:      Rate and Rhythm: Normal rate and regular rhythm.      Pulses: Normal pulses.      Heart sounds: Normal heart sounds.   Pulmonary:      Effort: Pulmonary effort is normal.      Breath sounds: Normal breath sounds.   Musculoskeletal:      Cervical back: Normal range of motion and neck supple.   Skin:     General: Skin is warm.      Capillary Refill: Capillary refill takes less than 2 seconds.   Neurological:      Mental Status: He is alert and oriented to person, place, and time.      Coordination: Coordination normal.      Gait: Gait normal.   Psychiatric:         Attention and Perception: " Attention normal.         Mood and Affect: Mood and affect normal.         Speech: Speech normal.         Behavior: Behavior normal.         Office Visit on 04/09/2021   Component Date Value Ref Range Status   • Microalbumin, Urine 04/09/2021 10   Final   • Creatinine, Urine 04/09/2021 300   Final   • Hemoglobin A1C 04/09/2021 7.4  % Final        Assessment/Plan:  Diagnoses and all orders for this visit:    1. Type 1 diabetes mellitus without complication (CMS/HCC) (Primary)  -     Ambulatory Referral to Endocrinology. Would like to discuss insulin pump, continuous glucose monitor for better control of diabetes.  -     POCT microalbumin        -     POC Glycosylated Hemoglobin (Hb A1C)-7.4.         - Continue to follow diabetic diet        - See eye doctor annually or as discussed        - Wear protective foot wear/no bare feet        - Check feet regularly for calluses or ulcers        - Discussed risk of poorly controlled diabetes and long-term complications        - Exercise as tolerated for 30-45 minutes most days of the week. Gradually increase daily exercise if not currently active.        - Take all medications as prescribed    2. Hypothyroidism, unspecified type  -     Ambulatory Referral to Endocrinology  -     TSH    3. Environmental and seasonal allergies  -     azelastine (ASTELIN) 0.1 % nasal spray; 2 sprays into the nostril(s) as directed by provider 2 (Two) Times a Day. Use in each nostril as directed  Dispense: 30 mL; Refill: 11        - Avoid known allergy triggers when possible, wash hands and face after exposure.  Consider changing clothes.        - HEPA filter in vacuum and HVAC        - Change/launder bed linens at least once a week        - Vacuum carpeted areas in the home, 2-3 times a week    4. Encounter for hepatitis C screening test for low risk patient  -     Hepatitis C Antibody    5. Elevated BUN  -     Basic Metabolic Panel.  Most recent BUN 29, 5 months ago.  Creatinine normal.           Return in about 6 months (around 10/9/2021) for Annual.

## 2021-04-12 DIAGNOSIS — I10 BENIGN ESSENTIAL HYPERTENSION: ICD-10-CM

## 2021-04-12 RX ORDER — HYDROCHLOROTHIAZIDE 25 MG/1
25 TABLET ORAL DAILY
Qty: 90 TABLET | Refills: 3 | Status: SHIPPED | OUTPATIENT
Start: 2021-04-12

## 2021-04-15 ENCOUNTER — IMMUNIZATION (OUTPATIENT)
Dept: VACCINE CLINIC | Facility: HOSPITAL | Age: 51
End: 2021-04-15

## 2021-04-15 PROCEDURE — 0002A: CPT | Performed by: INTERNAL MEDICINE

## 2021-04-15 PROCEDURE — 91300 HC SARSCOV02 VAC 30MCG/0.3ML IM: CPT | Performed by: INTERNAL MEDICINE

## 2021-04-19 DIAGNOSIS — E03.9 HYPOTHYROIDISM, UNSPECIFIED TYPE: ICD-10-CM

## 2021-04-19 RX ORDER — LEVOTHYROXINE SODIUM 0.05 MG/1
50 TABLET ORAL DAILY
Qty: 30 TABLET | Refills: 0 | Status: SHIPPED | OUTPATIENT
Start: 2021-04-19 | End: 2021-01-01 | Stop reason: DRUGHIGH

## 2021-04-19 NOTE — TELEPHONE ENCOUNTER
Needs to have labs rechecked before sending as 90 day. Last TSH was high, there's an order in for it to be rechecked, hasn't been collected.

## 2021-06-29 NOTE — PROGRESS NOTES
Subjective:    CC: Sina Reyez is in clinic today for follow up for tension type headaches.    HPI:  Current visit-this is a patient previously seen by Dr. Machado  for tension type headaches.  Patient states he was doing extremely well with amitriptyline 20 mg at night however his headache frequency worsened last fall.  At around the same time he was also diagnosed with hypothyroidism and started on medications.  His dose was recently adjusted as his TSH was still elevated (over 16 ).  Since doing so his headache frequency has improved.  He is still having about 2-3 severe headaches each month that mainly start of in the back of his head and neck.  Takes Fioricet with codeine that helps instantly.  Rarely has to take a second dose.  Of note-I reviewed Dr. Machado's notes as follows-    5/7/2019: He is in clinic for regular follow-up.  Since her last visit, he reports that amitriptyline 20 mg daily has helped significantly keeping the headaches under control.  He is currently having one headache every 3 weeks.  It is very mild and he does not help usually take anything over-the-counter as an abortive treatment.  Is tolerating medication well without any side effects.    6/29/2020: He is in clinic for regular follow-up.  Since his last visit, he reports that with amitriptyline 20 mg at bedtime, the headaches remain under excellent control.  Except for beginning of spring, he had increase frequency of headaches caused by sinus issues otherwise he has done really well.  He takes Fioricet as needed as an abortive treatment it works well.  He denies any side effects with amitriptyline use.      The following portions of the patient's history were reviewed and updated as of 06/29/2020: allergies, social history and problem list.       Current Outpatient Medications:   •  amitriptyline (ELAVIL) 10 MG tablet, Take 3 tablets by mouth Every Night., Disp: 90 tablet, Rfl: 11  •  azelastine (ASTELIN) 0.1 % nasal spray, 2 sprays  into the nostril(s) as directed by provider 2 (Two) Times a Day. Use in each nostril as directed, Disp: 30 mL, Rfl: 11  •  butalbital-acetaminophen-caffeine-codeine (FIORICET WITH CODEINE) -00-30 MG per capsule, TAKE TWO CAPSULES BY MOUTH EVERY 8 HOURS AS NEEDED FOR HEADACHE, Disp: 90 capsule, Rfl: 3  •  cyclobenzaprine (FLEXERIL) 5 MG tablet, Take 1 tablet by mouth 3 (Three) Times a Day As Needed for Muscle Spasms. Need appointment for further refills., Disp: 180 tablet, Rfl: 0  •  hydroCHLOROthiazide (HYDRODIURIL) 25 MG tablet, Take 1 tablet by mouth Daily., Disp: 90 tablet, Rfl: 3  •  ibuprofen (ADVIL,MOTRIN) 800 MG tablet, Take 1 tablet by mouth 3 (Three) Times a Day., Disp: 270 tablet, Rfl: 3  •  insulin regular (NOVOLIN R RELION) 100 UNIT/ML injection, Inject 10 Units under the skin 3 (Three) Times a Day Before Meals., Disp: 10 mL, Rfl: 12  •  levothyroxine (Synthroid) 75 MCG tablet, Take 1 tablet by mouth Daily., Disp: 90 tablet, Rfl: 0  •  lisinopril (PRINIVIL,ZESTRIL) 40 MG tablet, Take 1 tablet by mouth Daily., Disp: 90 tablet, Rfl: 1   Past Medical History:   Diagnosis Date   • Acute sinusitis    • Acute stress disorder    • Anxiety    • Diabetes mellitus (CMS/HCC)    • Hypertension    • Migraine    • MRSA infection    • Rib pain on left side    • Toe injury    • Verruca    • Viral gastroenteritis       Past Surgical History:   Procedure Laterality Date   • SHOULDER SURGERY Right 11/12/2020     (R) shoulder arthroscopy with biceps tenodesis, posterior labrum repair 11/12/2020   • TONSILLECTOMY        Family History   Problem Relation Age of Onset   • Transient ischemic attack Mother    • Cancer Mother    • Dementia Mother    • Hypertension Mother    • Stroke Father    • Diabetes Father    • Hypertension Father         Review of Systems   Constitutional: Negative.    HENT: Negative.    Eyes: Negative.    Respiratory: Negative.    Cardiovascular: Negative.    Gastrointestinal: Negative.    Endocrine:  "Negative.    Genitourinary: Negative.    Musculoskeletal: Negative.    Skin: Negative.    Allergic/Immunologic: Negative.    Neurological: Negative.    Hematological: Negative.    Psychiatric/Behavioral: Negative.    All other systems reviewed and are negative.    Objective:    /80   Pulse 87   Ht 177.8 cm (70\")   Wt 101 kg (223 lb)   SpO2 96%   BMI 32.00 kg/m²     Neurology Exam:  General apperance: NAD.     Mental status: Alert, awake and oriented to time place and person.    Recent and Remote memory: Can recall 3/3 objects at 5 minutes. Can recall historical events.     Attention span and Concentration: Serial 7s: Normal.     Fund of knowledge:  Normal.     Language and Speech: No aphasia or dysarthria.    Naming , Repitition and Comprehension:  Can name objects, repeat a sentence and follow commands. Speech is clear and fluent with good repetition, comprehension, and naming.    CN II to XII: Intact.    Opthalmoscopic Exam: No papilledema.    Motor:  Right UE muscle strength 5/5. Normal tone.     Left UE muscle strength 5/5. Normal tone.      Right LE muscle strength5/5. Normal tone.     Left LE muscle strength 5/5. Normal tone.      Sensory: Normal light touch, vibration and pinprick sensation bilaterally.    DTRs: 2+ bilaterally.    Babinski: Negative bilaterally.    Co-ordination: Normal finger-to-nose, heel to shin B/L.    Rhomberg: Negative.    Gait: Normal.    Cardiovascular: Regular rate and rhythm without murmur, gallop or rub.    Assessment and Plan:  1. Chronic tension-type headache, not intractable  I will increase his amitriptyline to 30 mg at bedtime  He can continue taking Fioricet as needed.  I discussed starting him on Maxalt however patient finds Fioricet effective and wants to stay on it.  Does not take it frequently  I have also counseled him to keep himself well-hydrated and get his thyroid level checked regularly    Return in about 6 months (around 12/29/2021) for with Dr Machado. "

## 2021-10-15 NOTE — PROGRESS NOTES
Chief Complaint   Patient presents with   • Annual Exam       Sina Reyez is a 51 y.o. male and is here for a comprehensive physical exam. The patient reports no problems. Has lost 17 pounds over the past 6 months, purposefully with diet changes and increased physical activity.       History:  Erectile dysfunction  no  Nocturia  no    Do you take any herbs or supplements that were not prescribed by a doctor? no  Are you taking aspirin daily? no    Health Habits:  Dental Exam. Up to date  Eye Exam. Up to date      Health Maintenance   Topic Date Due   • COLORECTAL CANCER SCREENING  Never done   • Hepatitis B (1 of 3 - Risk 3-dose series) Never done   • DIABETIC FOOT EXAM  Never done   • ZOSTER VACCINE (1 of 2) Never done   • DIABETIC EYE EXAM  03/01/2021   • HEMOGLOBIN A1C  07/09/2021   • LIPID PANEL  10/23/2021   • URINE MICROALBUMIN  04/09/2022   • ANNUAL PHYSICAL  10/21/2022   • TDAP/TD VACCINES (2 - Td or Tdap) 11/01/2027   • Pneumococcal Vaccine 0-64 (2 of 2 - PPSV23) 06/25/2034   • HEPATITIS C SCREENING  Completed   • COVID-19 Vaccine  Completed   • INFLUENZA VACCINE  Completed       PMH, PSH, SocHx, FamHx, Allergies, and Medications: Reviewed and updated in the Visit Navigator.     No Known Allergies  Past Medical History:   Diagnosis Date   • Acute sinusitis    • Acute stress disorder    • Anxiety    • Diabetes mellitus (HCC)    • Hypertension    • Migraine    • MRSA infection    • Rib pain on left side    • Toe injury    • Verruca    • Viral gastroenteritis      Past Surgical History:   Procedure Laterality Date   • SHOULDER SURGERY Right 11/12/2020     (R) shoulder arthroscopy with biceps tenodesis, posterior labrum repair 11/12/2020   • TONSILLECTOMY       Social History     Socioeconomic History   • Marital status:    Tobacco Use   • Smoking status: Never Smoker   • Smokeless tobacco: Never Used   Vaping Use   • Vaping Use: Never used   Substance and Sexual Activity   • Alcohol use: Yes      "Comment: 1-5 WEEKLY   • Drug use: No   • Sexual activity: Defer     Family History   Problem Relation Age of Onset   • Transient ischemic attack Mother    • Cancer Mother    • Dementia Mother    • Hypertension Mother    • Stroke Father    • Diabetes Father    • Hypertension Father      Objective   /60   Pulse 65   Temp 97.4 °F (36.3 °C)   Ht 177.8 cm (70\")   Wt 94.3 kg (208 lb)   SpO2 99%   BMI 29.84 kg/m²   Body mass index is 29.84 kg/m².    Physical Exam  Constitutional:       Appearance: He is well-developed. He is not ill-appearing.   HENT:      Head: Normocephalic.      Right Ear: Tympanic membrane, ear canal and external ear normal.      Left Ear: Tympanic membrane, ear canal and external ear normal.      Nose: Nose normal.      Mouth/Throat:      Mouth: Mucous membranes are moist.      Pharynx: Oropharynx is clear. Uvula midline.   Eyes:      Extraocular Movements: Extraocular movements intact.      Conjunctiva/sclera: Conjunctivae normal.      Pupils: Pupils are equal, round, and reactive to light.   Neck:      Thyroid: No thyromegaly.      Vascular: No carotid bruit.   Cardiovascular:      Rate and Rhythm: Normal rate and regular rhythm.      Pulses: Normal pulses.      Heart sounds: Normal heart sounds.   Pulmonary:      Effort: Pulmonary effort is normal.      Breath sounds: Normal breath sounds.   Abdominal:      General: Bowel sounds are normal.      Palpations: Abdomen is soft.      Tenderness: There is no abdominal tenderness.   Musculoskeletal:         General: No tenderness or deformity. Normal range of motion.      Cervical back: Full passive range of motion without pain, normal range of motion and neck supple.   Lymphadenopathy:      Cervical: No cervical adenopathy.   Skin:     General: Skin is warm.      Capillary Refill: Capillary refill takes less than 2 seconds.   Neurological:      Mental Status: He is alert and oriented to person, place, and time.      Sensory: No sensory " deficit.      Coordination: Coordination normal.      Gait: Gait normal.      Comments: CN II-XII normal   Psychiatric:         Attention and Perception: Attention normal.         Mood and Affect: Mood and affect normal.         Speech: Speech normal.         Behavior: Behavior normal.         Thought Content: Thought content normal.       Assessment/Plan   1. Healthy male exam.    2. Patient Counseling: Including but not Limited to the following, when appropriate:  --Nutrition: Stressed importance of moderation in sodium/caffeine intake, saturated fat and cholesterol, caloric balance, sufficient intake of fresh fruits, vegetables, fiber  --Exercise: Stressed the importance of regular exercise.   --Substance Abuse: Discussed cessation/primary prevention of tobacco, alcohol, or other drug use; driving or other dangerous activities under the influence; availability of treatment for abuse, as indicated based on social history.    --Sexuality: Discussed sexually transmitted diseases, partner selection, use of condoms and contraceptive alternatives.   --Injury prevention: Discussed safety belts, safety helmets, smoke detector, smoking near bedding or upholstery.   --Dental health: Discussed importance of regular tooth brushing, flossing, and dental visits.  --Immunizations reviewed.  3. Discussed the patient's BMI with him.  The BMI is above average; BMI management plan is completed  4. Return in about 1 year (around 10/15/2022) for Next scheduled follow up.  5. Age-appropriate Screening Scheduled    Assessment/Plan     Diagnoses and all orders for this visit:    1. Annual physical exam (Primary)    2. Benign essential hypertension  -     lisinopril (PRINIVIL,ZESTRIL) 20 MG tablet; Take 1 tablet by mouth Daily.  Dispense: 90 tablet; Refill: 1        - Follow heart healthy diet.  Advised to reduce daily sodium intake to < 1500 mg per day.  Avoid processed & fast foods.        - Monitor blood pressure as discussed, keep  log and bring to next appointment.          - Exercise as tolerated, with a goal of 30 minutes of moderate exercise most days.         - Take medications as prescribed.  3. Hypothyroidism, unspecified type  -     TSH        - Continue taking levothyroxine as prescribed, dose will be changed if lab indicates need    4. Type 1 diabetes mellitus without complication (HCC)  -     Continuous Blood Gluc Sensor (Dexcom G6 Sensor); Every 10 (Ten) Days.  Dispense: 3 each; Refill: 2  -     Continuous Blood Gluc  (Dexcom G6 ) device; 1 application 4 (Four) Times a Day Before Meals & at Bedtime.  Dispense: 1 each; Refill: 0  -     Hemoglobin A1c  -     Vitamin D 25 Hydroxy  - Continue insulin as prescribed        - Follow diabetic diet        - Monitor blood sugars as discussed, to better assess trends and glycemic response to certain food, drink, activities.  Advised fasting blood glucose should be < 130, 2 hours post-prandial should be < 180        - See eye doctor annually        - Wear protective foot wear/no bare feet        - Check feet regularly for calluses or ulcers        - Discussed risk of poorly controlled diabetes and long-term complications        - Exercise as tolerated for 30-45 minutes most days of the week. Gradually increase daily exercise if not currently active.        - Take all medications as prescribed    5. Environmental and seasonal allergies        - Avoid known allergy triggers when possible, wash hands and face after exposure.  Consider changing clothes.        - HEPA filter in vacuum and HVAC        - Change/launder bed linens at least once a week        - Vacuum carpeted areas in the home, 2-3 times a week    6. Hyperlipidemia, unspecified hyperlipidemia type  -     Lipid Panel  - Heart healthy diet    7. Migraine without aura and without status migrainosus, not intractable        - Avoid headache triggers    8. Overweight with body mass index (BMI) of 29 to 29.9 in adult         - Healthy diet.  Daily physical activity.      9. Screening for disorder of blood and blood-forming organs  -     CBC & Differential    10. Screening for endocrine, metabolic and immunity disorder  -     Comprehensive Metabolic Panel

## 2021-10-18 NOTE — TELEPHONE ENCOUNTER
From: Sina Reyez  To: AMBER Currie  Sent: 10/18/2021 9:22 AM EDT  Subject: Dexcom Denied    Good morning,     I’m guessing my insurance dug their heels in since Daniel sent me a text that my refil was ready and I owed $763. Wow.    Not sure if you want to try the other brand or if I have any options at this point.     Thanks

## 2021-11-04 NOTE — TELEPHONE ENCOUNTER
Hub staff attempted to follow warm transfer process and was unsuccessful       Caller: VOLODYMYR VALENCIA    Relationship to patient: [employer]I[facility] CHUN    Best call back number: [phone]    VOLODYMYR VALENCIA WITH [employer] WOULD LIKE A CALL BACK TO DISCUSS SCHEDULING DEPOSITION.

## 2021-12-12 NOTE — DISCHARGE INSTRUCTIONS
You may have a concussion from your head injury, do not see any intracranial bleeding, no spinal fractures.  You may have headache, blurred vision, nausea, emesis, dizziness or difficulty focusing for the next few days to weeks.  Try to avoid any injuries that could result in further head injury.  You may take ibuprofen and Tylenol as needed, drink plenty of fluids to stay well-hydrated.  Try to follow-up with your primary care provider in the next few days to reevaluate symptoms and ensure you are improving.  If your symptoms persist or worsen, you may need further outpatient imaging.  Return to the ER for any change, worsening symptoms, or any additional concerns.

## 2021-12-12 NOTE — ED PROVIDER NOTES
Subjective   Patient is a 51-year-old male with a history of diabetes, anxiety, hypertension, and migraines presenting to the ER for evaluation of head injury. Patient states just prior to arrival he was carrying a laundry basket and slipped on water in their hardwood floor and fell backwards striking his head on the floor. He states that he is unsure if he lost consciousness but he felt as if his eyes were fluttery after the fact. He states he has had quite severe headache since this and some neck pain. He does admit that he has had some alcohol tonight to the RN. He states he has been seeing double and has had nausea since this head injury. He denies any extremity paresthesias, chest pain, shortness of breath, dizziness, abdominal pain, lower back pain, lower extremity paresthesias, lower extremity pain. He does not take anticoagulation medications.          Review of Systems   Constitutional: Negative for chills and fever.   HENT: Negative for congestion, ear pain, sore throat and trouble swallowing.    Eyes: Positive for visual disturbance.   Respiratory: Negative.    Cardiovascular: Negative.    Gastrointestinal: Positive for nausea. Negative for abdominal pain, diarrhea and vomiting.   Genitourinary: Negative.    Musculoskeletal: Negative.    Skin: Negative.    Allergic/Immunologic: Negative for immunocompromised state.   Neurological: Positive for headaches.   Psychiatric/Behavioral: Negative.        Past Medical History:   Diagnosis Date   • Acute sinusitis    • Acute stress disorder    • Anxiety    • Diabetes mellitus (HCC)    • Hypertension    • Migraine    • MRSA infection    • Rib pain on left side    • Toe injury    • Verruca    • Viral gastroenteritis        No Known Allergies    Past Surgical History:   Procedure Laterality Date   • SHOULDER SURGERY Right 11/12/2020     (R) shoulder arthroscopy with biceps tenodesis, posterior labrum repair 11/12/2020   • TONSILLECTOMY         Family History   Problem  "Relation Age of Onset   • Transient ischemic attack Mother    • Cancer Mother    • Dementia Mother    • Hypertension Mother    • Stroke Father    • Diabetes Father    • Hypertension Father        Social History     Socioeconomic History   • Marital status:    Tobacco Use   • Smoking status: Never Smoker   • Smokeless tobacco: Never Used   Vaping Use   • Vaping Use: Never used   Substance and Sexual Activity   • Alcohol use: Yes     Comment: 1-5 WEEKLY   • Drug use: No   • Sexual activity: Defer           Objective   Physical Exam  Vitals and nursing note reviewed.     /75 (BP Location: Right arm, Patient Position: Sitting)   Pulse (!) 123   Temp 98.1 °F (36.7 °C) (Oral)   Resp 17   Ht 177.8 cm (70\")   Wt 93.4 kg (206 lb)   SpO2 95%   BMI 29.56 kg/m²     GEN: No acute distress, sitting from the stretcher. He is awake and alert. He does not appear septic or toxic. He is answering questions appropriately.  Head: Normocephalic, there is tenderness over the occipital region, no obvious edema, deformity.  Neck: There is some tenderness to the midline cervical spine on the inferior aspect of the skull. No obvious deformity appreciated  Eyes: EOM intact, PERRL  ENT: Mask in place per protocol  Chest: Nontender to palpation  Cardiovascular: Tachycardic, regular rhythm  Lungs: Clear to auscultation bilaterally without adventitious sounds  Abdomen: Soft, nontender, nondistended, no peritoneal signs, no guarding  Extremities: No edema, normal appearance, full range of motion without deficits, distal pulses intact  Back: Some mild midline cervical tenderness as noted above, no midline thoracic or lumbar spine tenderness  Neuro: GCS 15  Psych: Mood and affect are appropriate    Procedures           ED Course  ED Course as of 12/12/21 0052   Sat Dec 11, 2021   2331 WBC: 7.66 [LA]   2331 Hemoglobin: 14.2 [LA]   2331 FINAL REPORT     TECHNIQUE:  Thin section axial images were obtained through the cervical  spine " without contrast.  Coronal and sagittal reconstructed  images were then provided.     CLINICAL HISTORY:  Fall, neck pain, high risk for C-spine injury     FINDINGS:  There is normal alignment and curvature.  Prevertebral soft  tissues are normal.  There is no fracture.  There is no  significant degenerative disease.     IMPRESSION:  Unremarkable.     Authenticated by Osiel Saravia M.D. on 12/11/2021 11:29:22 PM          Specimen Collected: 12/11/21 23:29         [LA]   2331 FINAL REPORT     TECHNIQUE:  Routine axial images through the head were obtained without  contrast.     CLINICAL HISTORY:  Fall, occipital head trauma, posttraumatic headache     FINDINGS:  The ventricles are normal.  There is no mass or other abnormal  hypodensity.  There is no shift of midline structures.  There is  no intracranial hemorrhage.  No acute sinus or osseous  abnormality is seen.     IMPRESSION:  Unremarkable.     Authenticated by Osiel Saravia M.D. on 12/11/2021 11:28:58 PM          Specimen Collected: 12/11/21 23:28         [LA]   2345 Glucose(!): 204 [LA]   2345 BUN(!): 26 [LA]   2345 Creatinine(!): 1.44 [LA]   2345 Sodium(!): 135 [LA]   2345 Potassium: 4.0 [LA]   2345 Chloride: 99 [LA]   2345 CO2: 26.6 [LA]   2345 Calcium: 9.4 [LA]   2345 Total Protein: 6.7 [LA]   2345 Albumin: 4.30 [LA]   2345 ALT (SGPT): 27 [LA]   2345 AST (SGOT): 20 [LA]   2345 Alkaline Phosphatase: 92 [LA]   2345 Total Bilirubin: 0.3 [LA]   2345 eGFR Non  Am(!): 52 [LA]   2345 Globulin: 2.4 [LA]   2345 Anion Gap: 9.4 [LA]   Sun Dec 12, 2021   0001 Patient states his headache has resolved completely, heart rate is 99 on the monitor while I was at bedside.  Discussed very strict return precautions, follow-up, symptomatic treatment.  He verbalized understanding and was in agreement with this plan of care [LA]      ED Course User Index  [LA] Meenu Luna PA-C                                                 MDM  Number of Diagnoses or Management  Options  Acute post-traumatic headache, not intractable  Closed head injury, initial encounter  Fall, initial encounter  Diagnosis management comments: On arrival, patient is tachycardic but normotensive, afebrile, saturating 95% on room air. Differential could include closed head injury, vertebral injury, alcohol use, dehydration, electrolyte abnormalities, and other concerns. He denies any chest pain or shortness of breath. Will place c-collar on the patient, obtain CT of the head and cervical spine. Will obtain basic labs initiate IV fluids and give Zofran as well.      CT of the head and cervical spine were unremarkable per radiology read.  His blood glucose was mildly elevated, his creatinine was fairly elevated from baseline at 1.44, baseline appears to be 1.2 in comparison to previous.  He did receive IV fluids here.  We did give him Inapsine and Benadryl which resolved his headache completely.  His heart rate did improve, was 99 on the cardiac monitor while was at bedside.  Believe she can be discharged at this time with close follow-up.  We discussed symptomatic treatment for closed head injury, follow-up with primary care provider and strict return precautions.  He verbalized understanding and was in agreement with this plan of care.       Amount and/or Complexity of Data Reviewed  Clinical lab tests: reviewed and ordered  Tests in the radiology section of CPT®: reviewed and ordered  Discussion of test results with the performing providers: yes  Decide to obtain previous medical records or to obtain history from someone other than the patient: yes  Review and summarize past medical records: yes  Discuss the patient with other providers: yes    Risk of Complications, Morbidity, and/or Mortality  Presenting problems: moderate  Diagnostic procedures: moderate  Management options: low    Patient Progress  Patient progress: improved      Final diagnoses:   Fall, initial encounter   Closed head injury, initial  encounter   Acute post-traumatic headache, not intractable       ED Disposition  ED Disposition     ED Disposition Condition Comment    Discharge Stable           Nika Ervin, APRN  [address] [address] [phone]    Schedule an appointment as soon as possible for a visit            Medication List      No changes were made to your prescriptions during this visit.          Meenu Luna PA-C  12/12/21 0052

## 2022-01-01 ENCOUNTER — TELEPHONE (OUTPATIENT)
Dept: INTERNAL MEDICINE | Facility: CLINIC | Age: 52
End: 2022-01-01

## 2022-01-01 ENCOUNTER — OFFICE VISIT (OUTPATIENT)
Dept: NEUROLOGY | Facility: CLINIC | Age: 52
End: 2022-01-01

## 2022-01-01 VITALS — WEIGHT: 215 LBS | BODY MASS INDEX: 30.78 KG/M2 | OXYGEN SATURATION: 97 % | HEART RATE: 85 BPM | HEIGHT: 70 IN

## 2022-01-01 DIAGNOSIS — I10 BENIGN ESSENTIAL HYPERTENSION: ICD-10-CM

## 2022-01-01 DIAGNOSIS — G44.229 CHRONIC TENSION-TYPE HEADACHE, NOT INTRACTABLE: ICD-10-CM

## 2022-01-01 DIAGNOSIS — G44.229 CHRONIC TENSION-TYPE HEADACHE, NOT INTRACTABLE: Primary | ICD-10-CM

## 2022-01-01 PROCEDURE — 99214 OFFICE O/P EST MOD 30 MIN: CPT | Performed by: PSYCHIATRY & NEUROLOGY

## 2022-01-01 RX ORDER — BUTALBITAL, ACETAMINOPHEN, CAFFEINE AND CODEINE PHOSPHATE 50; 325; 40; 30 MG/1; MG/1; MG/1; MG/1
CAPSULE ORAL
Qty: 90 CAPSULE | Refills: 3 | Status: SHIPPED | OUTPATIENT
Start: 2022-01-01 | End: 2022-01-01 | Stop reason: SDUPTHER

## 2022-01-01 RX ORDER — LEVOTHYROXINE SODIUM 0.07 MG/1
75 TABLET ORAL DAILY
Qty: 90 TABLET | Refills: 0 | Status: SHIPPED | OUTPATIENT
Start: 2022-01-01

## 2022-01-01 RX ORDER — LISINOPRIL 20 MG/1
20 TABLET ORAL DAILY
Qty: 90 TABLET | Refills: 1 | Status: SHIPPED | OUTPATIENT
Start: 2022-01-01

## 2022-01-01 RX ORDER — AMITRIPTYLINE HYDROCHLORIDE 10 MG/1
30 TABLET, FILM COATED ORAL NIGHTLY
Qty: 270 TABLET | Refills: 4 | Status: SHIPPED | OUTPATIENT
Start: 2022-01-01

## 2022-01-01 RX ORDER — CLONAZEPAM 0.5 MG/1
TABLET ORAL
COMMUNITY
Start: 2022-01-01

## 2022-01-01 RX ORDER — BUTALBITAL, ACETAMINOPHEN, CAFFEINE AND CODEINE PHOSPHATE 50; 325; 40; 30 MG/1; MG/1; MG/1; MG/1
CAPSULE ORAL
Qty: 90 CAPSULE | Refills: 3 | Status: SHIPPED | OUTPATIENT
Start: 2022-01-01

## 2022-01-28 NOTE — TELEPHONE ENCOUNTER
Caller: Sina Reyez    Relationship: Self    Best call back number: [phone]     What was the call regarding: PATIENT STATED THAT HIS WIFE TESTED POSITIVE FROM COVID. PATIENT STATED THAT HE IS A [employer] AND WANTED TO KNOW WHAT ARE HIS CHANGES OF GETTING IT AGAIN SINCE HE HAD IT IN December. PATIENT IS HAVING A HEADACHE AND SORE THROAT.    Do you require a callback: YES

## 2022-01-28 NOTE — TELEPHONE ENCOUNTER
Please advise patient it is unlikely he would have COVID again, but since there is a new variant it is possible.  He can go to [facility] drive thru for testing.  If he does not have symptoms, should be tested 5-7 days after exposure.  That testing site will not be open over the weekend but there are other options.

## 2022-03-07 NOTE — PROGRESS NOTES
Subjective:    CC: Sina Reyez is in clinic today for follow up for      HPI:  Current visit-this is a patient previously seen by Dr. Machado  for tension type headaches.  Patient states he was doing extremely well with amitriptyline 20 mg at night however his headache frequency worsened last fall.  At around the same time he was also diagnosed with hypothyroidism and started on medications.  His dose was recently adjusted as his TSH was still elevated (over 16 ).  Since doing so his headache frequency has improved.  He is still having about 2-3 severe headaches each month that mainly start of in the back of his head and neck.  Takes Fioricet with codeine that helps instantly.  Rarely has to take a second dose.  Of note-I reviewed Dr. Machado's notes as follows-    5/7/2019: He is in clinic for regular follow-up.  Since her last visit, he reports that amitriptyline 20 mg daily has helped significantly keeping the headaches under control.  He is currently having one headache every 3 weeks.  It is very mild and he does not help usually take anything over-the-counter as an abortive treatment.  Is tolerating medication well without any side effects.    6/29/2020: He is in clinic for regular follow-up.  Since his last visit, he reports that with amitriptyline 20 mg at bedtime, the headaches remain under excellent control.  Except for beginning of spring, he had increase frequency of headaches caused by sinus issues otherwise he has done really well.  He takes Fioricet as needed as an abortive treatment it works well.  He denies any side effects with amitriptyline use.    Follow-up: 3/7/2022: He is in clinic for regular follow-up.  Since his last visit in June 2021, he reports that headaches remain under excellent control.  On the last visit, amitriptyline dose was increased to 30 mg daily as a 20 mg dose, he was still having breakthrough headaches.  Currently with this dose, he has 1 headache every other week responding  well to Fioricet.  Typical trigger is barometric pressure change.  He is tolerating amitriptyline well without any side effects.    The following portions of the patient's history were reviewed and updated as of 03/07/2022: allergies, social history and problem list.       Current Outpatient Medications:   •  amitriptyline (ELAVIL) 10 MG tablet, Take 3 tablets by mouth Every Night., Disp: 90 tablet, Rfl: 11  •  azelastine (ASTELIN) 0.1 % nasal spray, 2 sprays into the nostril(s) as directed by provider 2 (Two) Times a Day. Use in each nostril as directed, Disp: 30 mL, Rfl: 11  •  butalbital-acetaminophen-caffeine-codeine (FIORICET WITH CODEINE) -11-30 MG per capsule, TAKE TWO CAPSULES BY MOUTH EVERY 8 HOURS AS NEEDED FOR HEADACHE, Disp: 90 capsule, Rfl: 3  •  clonazePAM (KlonoPIN) 0.5 MG tablet, , Disp: , Rfl:   •  Continuous Blood Gluc  (FreeStyle Tierra 2 Butler) device, 1 application 4 (Four) Times a Day Before Meals & at Bedtime., Disp: 1 each, Rfl: 0  •  Continuous Blood Gluc Sensor (FreeStyle Tierra 2 Sensor) misc, 1 application Every 14 (Fourteen) Days., Disp: 2 each, Rfl: 11  •  hydroCHLOROthiazide (HYDRODIURIL) 25 MG tablet, Take 1 tablet by mouth Daily., Disp: 90 tablet, Rfl: 3  •  insulin regular (NOVOLIN R RELION) 100 UNIT/ML injection, Inject 10 Units under the skin 3 (Three) Times a Day Before Meals., Disp: 10 mL, Rfl: 12  •  levothyroxine (Synthroid) 75 MCG tablet, Take 1 tablet by mouth Daily., Disp: 90 tablet, Rfl: 0  •  lisinopril (PRINIVIL,ZESTRIL) 20 MG tablet, Take 1 tablet by mouth Daily., Disp: 90 tablet, Rfl: 1   Past Medical History:   Diagnosis Date   • Acute sinusitis    • Acute stress disorder    • Anxiety    • Diabetes mellitus (HCC)    • Hypertension    • Migraine    • MRSA infection    • Rib pain on left side    • Toe injury    • Verruca    • Viral gastroenteritis       Past Surgical History:   Procedure Laterality Date   • SHOULDER SURGERY Right 11/12/2020     (R) shoulder  "arthroscopy with biceps tenodesis, posterior labrum repair 11/12/2020   • TONSILLECTOMY        Family History   Problem Relation Age of Onset   • Transient ischemic attack Mother    • Cancer Mother    • Dementia Mother    • Hypertension Mother    • Stroke Father    • Diabetes Father    • Hypertension Father         Review of Systems  Objective:    Pulse 85   Ht 177.8 cm (70\")   Wt 97.5 kg (215 lb)   SpO2 97%   BMI 30.85 kg/m²     Neurology Exam:  General apperance: NAD.     Mental status: Alert, awake and oriented to time place and person.    Recent and Remote memory: Can recall 3/3 objects at 5 minutes. Can recall historical events.     Attention span and Concentration: Serial 7s: Normal.     Fund of knowledge:  Normal.     Language and Speech: No aphasia or dysarthria.    Naming , Repitition and Comprehension:  Can name objects, repeat a sentence and follow commands. Speech is clear and fluent with good repetition, comprehension, and naming.    CN II to XII: Intact.    Opthalmoscopic Exam: No papilledema.    Motor:  Right UE muscle strength 5/5. Normal tone.     Left UE muscle strength 5/5. Normal tone.      Right LE muscle strength5/5. Normal tone.     Left LE muscle strength 5/5. Normal tone.      Sensory: Normal light touch, vibration and pinprick sensation bilaterally.    DTRs: 2+ bilaterally.    Babinski: Negative bilaterally.    Co-ordination: Normal finger-to-nose, heel to shin B/L.    Rhomberg: Negative.    Gait: Normal.    Cardiovascular: Regular rate and rhythm without murmur, gallop or rub.    Assessment and Plan:  1. Chronic tension-type headache, not intractable  -Under excellent control with amitriptyline 30 mg at bedtime.  Continue with this dose and I will see him back in 1 year for follow-up.       I spent 30 minutes in patient care: Reviewing records prior to the visit, entering orders and documentation and spent more than ga 50% of this time face-to-face in management, instructions and " education regarding above mentioned diagnosis and also on counseling and discussing about taking medication regularly, possible side effects with medication use, importance of good sleep hygiene, good hydration and regular exercise.    Return in about 1 year (around 3/7/2023).